# Patient Record
Sex: MALE | Race: WHITE | Employment: OTHER | URBAN - METROPOLITAN AREA
[De-identification: names, ages, dates, MRNs, and addresses within clinical notes are randomized per-mention and may not be internally consistent; named-entity substitution may affect disease eponyms.]

---

## 2024-08-12 ENCOUNTER — APPOINTMENT (OUTPATIENT)
Dept: GENERAL RADIOLOGY | Age: 74
DRG: 324 | End: 2024-08-12
Payer: OTHER GOVERNMENT

## 2024-08-12 ENCOUNTER — HOSPITAL ENCOUNTER (INPATIENT)
Age: 74
LOS: 2 days | Discharge: HOME | DRG: 324 | End: 2024-08-14
Attending: EMERGENCY MEDICINE | Admitting: INTERNAL MEDICINE
Payer: OTHER GOVERNMENT

## 2024-08-12 DIAGNOSIS — R07.9 CHEST PAIN, UNSPECIFIED TYPE: ICD-10-CM

## 2024-08-12 DIAGNOSIS — I21.3 STEMI (ST ELEVATION MYOCARDIAL INFARCTION) (HCC): ICD-10-CM

## 2024-08-12 DIAGNOSIS — R07.9 CHEST PAIN: ICD-10-CM

## 2024-08-12 DIAGNOSIS — I21.3 ST ELEVATION MYOCARDIAL INFARCTION (STEMI), UNSPECIFIED ARTERY (HCC): Primary | ICD-10-CM

## 2024-08-12 LAB
ALBUMIN SERPL-MCNC: 4.6 G/DL (ref 3.4–5)
ALBUMIN/GLOB SERPL: 1.5 {RATIO} (ref 1.1–2.2)
ALP SERPL-CCNC: 76 U/L (ref 40–129)
ALT SERPL-CCNC: 11 U/L (ref 10–40)
ANION GAP SERPL CALCULATED.3IONS-SCNC: 14 MMOL/L (ref 3–16)
APTT BLD: 25.3 SEC (ref 22.1–36.4)
AST SERPL-CCNC: 21 U/L (ref 15–37)
BASOPHILS # BLD: 0.1 K/UL (ref 0–0.2)
BASOPHILS NFR BLD: 0.9 %
BILIRUB SERPL-MCNC: 0.5 MG/DL (ref 0–1)
BUN SERPL-MCNC: 17 MG/DL (ref 7–20)
CALCIUM SERPL-MCNC: 9.4 MG/DL (ref 8.3–10.6)
CHLORIDE SERPL-SCNC: 97 MMOL/L (ref 99–110)
CO2 SERPL-SCNC: 23 MMOL/L (ref 21–32)
CREAT SERPL-MCNC: 1.4 MG/DL (ref 0.8–1.3)
DEPRECATED RDW RBC AUTO: 13.6 % (ref 12.4–15.4)
ECHO BSA: 1.91 M2
EOSINOPHIL # BLD: 0.1 K/UL (ref 0–0.6)
EOSINOPHIL NFR BLD: 1.6 %
GFR SERPLBLD CREATININE-BSD FMLA CKD-EPI: 52 ML/MIN/{1.73_M2}
GLUCOSE SERPL-MCNC: 118 MG/DL (ref 70–99)
HCT VFR BLD AUTO: 42.7 % (ref 40.5–52.5)
HGB BLD-MCNC: 14.7 G/DL (ref 13.5–17.5)
INR PPP: 0.95 (ref 0.85–1.15)
LYMPHOCYTES # BLD: 2.3 K/UL (ref 1–5.1)
LYMPHOCYTES NFR BLD: 35.2 %
MCH RBC QN AUTO: 31.7 PG (ref 26–34)
MCHC RBC AUTO-ENTMCNC: 34.5 G/DL (ref 31–36)
MCV RBC AUTO: 91.9 FL (ref 80–100)
MONOCYTES # BLD: 0.8 K/UL (ref 0–1.3)
MONOCYTES NFR BLD: 12.9 %
NEUTROPHILS # BLD: 3.2 K/UL (ref 1.7–7.7)
NEUTROPHILS NFR BLD: 49.4 %
NT-PROBNP SERPL-MCNC: 45 PG/ML (ref 0–449)
PLATELET # BLD AUTO: 161 K/UL (ref 135–450)
PMV BLD AUTO: 8.6 FL (ref 5–10.5)
POTASSIUM SERPL-SCNC: 3.8 MMOL/L (ref 3.5–5.1)
PROT SERPL-MCNC: 7.6 G/DL (ref 6.4–8.2)
PROTHROMBIN TIME: 12.9 SEC (ref 11.9–14.9)
RBC # BLD AUTO: 4.64 M/UL (ref 4.2–5.9)
SODIUM SERPL-SCNC: 134 MMOL/L (ref 136–145)
TROPONIN, HIGH SENSITIVITY: 15 NG/L (ref 0–22)
WBC # BLD AUTO: 6.4 K/UL (ref 4–11)

## 2024-08-12 PROCEDURE — 85730 THROMBOPLASTIN TIME PARTIAL: CPT

## 2024-08-12 PROCEDURE — 2500000003 HC RX 250 WO HCPCS: Performed by: INTERNAL MEDICINE

## 2024-08-12 PROCEDURE — 93458 L HRT ARTERY/VENTRICLE ANGIO: CPT | Performed by: INTERNAL MEDICINE

## 2024-08-12 PROCEDURE — 85610 PROTHROMBIN TIME: CPT

## 2024-08-12 PROCEDURE — 2580000003 HC RX 258: Performed by: INTERNAL MEDICINE

## 2024-08-12 PROCEDURE — 4A023N7 MEASUREMENT OF CARDIAC SAMPLING AND PRESSURE, LEFT HEART, PERCUTANEOUS APPROACH: ICD-10-PCS | Performed by: INTERNAL MEDICINE

## 2024-08-12 PROCEDURE — 6370000000 HC RX 637 (ALT 250 FOR IP): Performed by: PHYSICIAN ASSISTANT

## 2024-08-12 PROCEDURE — 6360000002 HC RX W HCPCS: Performed by: INTERNAL MEDICINE

## 2024-08-12 PROCEDURE — 96374 THER/PROPH/DIAG INJ IV PUSH: CPT

## 2024-08-12 PROCEDURE — 99152 MOD SED SAME PHYS/QHP 5/>YRS: CPT | Performed by: INTERNAL MEDICINE

## 2024-08-12 PROCEDURE — 93005 ELECTROCARDIOGRAM TRACING: CPT | Performed by: EMERGENCY MEDICINE

## 2024-08-12 PROCEDURE — 71045 X-RAY EXAM CHEST 1 VIEW: CPT

## 2024-08-12 PROCEDURE — 2500000003 HC RX 250 WO HCPCS: Performed by: EMERGENCY MEDICINE

## 2024-08-12 PROCEDURE — 96375 TX/PRO/DX INJ NEW DRUG ADDON: CPT

## 2024-08-12 PROCEDURE — 84484 ASSAY OF TROPONIN QUANT: CPT

## 2024-08-12 PROCEDURE — 80053 COMPREHEN METABOLIC PANEL: CPT

## 2024-08-12 PROCEDURE — 2709999900 HC NON-CHARGEABLE SUPPLY: Performed by: INTERNAL MEDICINE

## 2024-08-12 PROCEDURE — 6360000004 HC RX CONTRAST MEDICATION: Performed by: INTERNAL MEDICINE

## 2024-08-12 PROCEDURE — 2000000000 HC ICU R&B

## 2024-08-12 PROCEDURE — C1769 GUIDE WIRE: HCPCS | Performed by: INTERNAL MEDICINE

## 2024-08-12 PROCEDURE — 6360000002 HC RX W HCPCS: Performed by: EMERGENCY MEDICINE

## 2024-08-12 PROCEDURE — B2111ZZ FLUOROSCOPY OF MULTIPLE CORONARY ARTERIES USING LOW OSMOLAR CONTRAST: ICD-10-PCS | Performed by: INTERNAL MEDICINE

## 2024-08-12 PROCEDURE — 99285 EMERGENCY DEPT VISIT HI MDM: CPT

## 2024-08-12 PROCEDURE — 83880 ASSAY OF NATRIURETIC PEPTIDE: CPT

## 2024-08-12 PROCEDURE — 99153 MOD SED SAME PHYS/QHP EA: CPT | Performed by: INTERNAL MEDICINE

## 2024-08-12 PROCEDURE — B2151ZZ FLUOROSCOPY OF LEFT HEART USING LOW OSMOLAR CONTRAST: ICD-10-PCS | Performed by: INTERNAL MEDICINE

## 2024-08-12 PROCEDURE — C1894 INTRO/SHEATH, NON-LASER: HCPCS | Performed by: INTERNAL MEDICINE

## 2024-08-12 PROCEDURE — 85025 COMPLETE CBC W/AUTO DIFF WBC: CPT

## 2024-08-12 RX ORDER — HEPARIN SODIUM 1000 [USP'U]/ML
4000 INJECTION, SOLUTION INTRAVENOUS; SUBCUTANEOUS PRN
Status: DISCONTINUED | OUTPATIENT
Start: 2024-08-12 | End: 2024-08-14 | Stop reason: HOSPADM

## 2024-08-12 RX ORDER — METOPROLOL TARTRATE 1 MG/ML
5 INJECTION, SOLUTION INTRAVENOUS ONCE
Status: COMPLETED | OUTPATIENT
Start: 2024-08-12 | End: 2024-08-12

## 2024-08-12 RX ORDER — ACETAMINOPHEN 325 MG/1
650 TABLET ORAL EVERY 4 HOURS PRN
Status: DISCONTINUED | OUTPATIENT
Start: 2024-08-12 | End: 2024-08-14 | Stop reason: HOSPADM

## 2024-08-12 RX ORDER — HEPARIN SODIUM 1000 [USP'U]/ML
4000 INJECTION, SOLUTION INTRAVENOUS; SUBCUTANEOUS ONCE
Status: DISCONTINUED | OUTPATIENT
Start: 2024-08-12 | End: 2024-08-14 | Stop reason: HOSPADM

## 2024-08-12 RX ORDER — HEPARIN SODIUM 1000 [USP'U]/ML
2000 INJECTION, SOLUTION INTRAVENOUS; SUBCUTANEOUS PRN
Status: DISCONTINUED | OUTPATIENT
Start: 2024-08-12 | End: 2024-08-14 | Stop reason: HOSPADM

## 2024-08-12 RX ORDER — HEPARIN SODIUM 1000 [USP'U]/ML
INJECTION, SOLUTION INTRAVENOUS; SUBCUTANEOUS PRN
Status: DISCONTINUED | OUTPATIENT
Start: 2024-08-12 | End: 2024-08-12 | Stop reason: HOSPADM

## 2024-08-12 RX ORDER — SODIUM CHLORIDE 0.9 % (FLUSH) 0.9 %
5-40 SYRINGE (ML) INJECTION PRN
Status: DISCONTINUED | OUTPATIENT
Start: 2024-08-12 | End: 2024-08-14 | Stop reason: HOSPADM

## 2024-08-12 RX ORDER — MIDAZOLAM HYDROCHLORIDE 1 MG/ML
INJECTION INTRAMUSCULAR; INTRAVENOUS PRN
Status: DISCONTINUED | OUTPATIENT
Start: 2024-08-12 | End: 2024-08-12 | Stop reason: HOSPADM

## 2024-08-12 RX ORDER — ASPIRIN 81 MG/1
81 TABLET ORAL DAILY
Status: DISCONTINUED | OUTPATIENT
Start: 2024-08-13 | End: 2024-08-14 | Stop reason: HOSPADM

## 2024-08-12 RX ORDER — ASPIRIN 81 MG/1
324 TABLET, CHEWABLE ORAL ONCE
Status: COMPLETED | OUTPATIENT
Start: 2024-08-12 | End: 2024-08-12

## 2024-08-12 RX ORDER — SODIUM CHLORIDE 9 MG/ML
INJECTION, SOLUTION INTRAVENOUS CONTINUOUS
Status: ACTIVE | OUTPATIENT
Start: 2024-08-12 | End: 2024-08-13

## 2024-08-12 RX ORDER — METOPROLOL SUCCINATE 25 MG/1
25 TABLET, EXTENDED RELEASE ORAL DAILY
Status: DISCONTINUED | OUTPATIENT
Start: 2024-08-13 | End: 2024-08-14 | Stop reason: HOSPADM

## 2024-08-12 RX ORDER — HEPARIN SODIUM 10000 [USP'U]/100ML
5-30 INJECTION, SOLUTION INTRAVENOUS CONTINUOUS
Status: DISCONTINUED | OUTPATIENT
Start: 2024-08-12 | End: 2024-08-13

## 2024-08-12 RX ORDER — SODIUM CHLORIDE 0.9 % (FLUSH) 0.9 %
5-40 SYRINGE (ML) INJECTION EVERY 12 HOURS SCHEDULED
Status: DISCONTINUED | OUTPATIENT
Start: 2024-08-12 | End: 2024-08-14 | Stop reason: HOSPADM

## 2024-08-12 RX ORDER — LIDOCAINE HYDROCHLORIDE 10 MG/ML
INJECTION, SOLUTION INFILTRATION; PERINEURAL PRN
Status: DISCONTINUED | OUTPATIENT
Start: 2024-08-12 | End: 2024-08-12 | Stop reason: HOSPADM

## 2024-08-12 RX ORDER — ATORVASTATIN CALCIUM 80 MG/1
80 TABLET, FILM COATED ORAL NIGHTLY
Status: DISCONTINUED | OUTPATIENT
Start: 2024-08-13 | End: 2024-08-14 | Stop reason: HOSPADM

## 2024-08-12 RX ORDER — NITROGLYCERIN 20 MG/100ML
5-200 INJECTION INTRAVENOUS CONTINUOUS
Status: DISCONTINUED | OUTPATIENT
Start: 2024-08-12 | End: 2024-08-14 | Stop reason: HOSPADM

## 2024-08-12 RX ADMIN — ASPIRIN 324 MG: 81 TABLET, CHEWABLE ORAL at 21:18

## 2024-08-12 RX ADMIN — NITROGLYCERIN 0.5 INCH: 20 OINTMENT TOPICAL at 21:19

## 2024-08-12 RX ADMIN — Medication 12 UNITS/KG/HR: at 22:53

## 2024-08-12 RX ADMIN — NITROGLYCERIN 5 MCG/MIN: 20 INJECTION INTRAVENOUS at 21:44

## 2024-08-12 RX ADMIN — SODIUM CHLORIDE, PRESERVATIVE FREE 10 ML: 5 INJECTION INTRAVENOUS at 23:32

## 2024-08-12 RX ADMIN — SODIUM CHLORIDE: 9 INJECTION, SOLUTION INTRAVENOUS at 23:31

## 2024-08-12 RX ADMIN — METOPROLOL TARTRATE 5 MG: 5 INJECTION INTRAVENOUS at 21:44

## 2024-08-12 ASSESSMENT — ENCOUNTER SYMPTOMS
DIARRHEA: 0
NAUSEA: 0
SHORTNESS OF BREATH: 0
COUGH: 0
VOMITING: 0
ABDOMINAL PAIN: 0
RHINORRHEA: 0

## 2024-08-12 ASSESSMENT — PAIN - FUNCTIONAL ASSESSMENT: PAIN_FUNCTIONAL_ASSESSMENT: 0-10

## 2024-08-12 ASSESSMENT — PAIN DESCRIPTION - DESCRIPTORS: DESCRIPTORS: PRESSURE;TIGHTNESS

## 2024-08-12 ASSESSMENT — PAIN DESCRIPTION - ORIENTATION: ORIENTATION: LEFT

## 2024-08-12 ASSESSMENT — PAIN DESCRIPTION - LOCATION: LOCATION: CHEST

## 2024-08-12 ASSESSMENT — LIFESTYLE VARIABLES
HOW MANY STANDARD DRINKS CONTAINING ALCOHOL DO YOU HAVE ON A TYPICAL DAY: PATIENT DOES NOT DRINK
HOW OFTEN DO YOU HAVE A DRINK CONTAINING ALCOHOL: NEVER

## 2024-08-12 ASSESSMENT — PAIN SCALES - GENERAL
PAINLEVEL_OUTOF10: 4
PAINLEVEL_OUTOF10: 0
PAINLEVEL_OUTOF10: 7

## 2024-08-13 ENCOUNTER — APPOINTMENT (OUTPATIENT)
Age: 74
DRG: 324 | End: 2024-08-13
Attending: INTERNAL MEDICINE
Payer: OTHER GOVERNMENT

## 2024-08-13 ENCOUNTER — APPOINTMENT (OUTPATIENT)
Dept: VASCULAR LAB | Age: 74
DRG: 324 | End: 2024-08-13
Payer: OTHER GOVERNMENT

## 2024-08-13 DIAGNOSIS — I25.10 CORONARY ARTERY DISEASE DUE TO LIPID RICH PLAQUE: Primary | ICD-10-CM

## 2024-08-13 DIAGNOSIS — I25.83 CORONARY ARTERY DISEASE DUE TO LIPID RICH PLAQUE: Primary | ICD-10-CM

## 2024-08-13 LAB
ANION GAP SERPL CALCULATED.3IONS-SCNC: 13 MMOL/L (ref 3–16)
ANTI-XA UNFRAC HEPARIN: 0.22 IU/ML (ref 0.3–0.7)
ANTI-XA UNFRAC HEPARIN: 0.27 IU/ML (ref 0.3–0.7)
BUN SERPL-MCNC: 14 MG/DL (ref 7–20)
CALCIUM SERPL-MCNC: 9.1 MG/DL (ref 8.3–10.6)
CHLORIDE SERPL-SCNC: 102 MMOL/L (ref 99–110)
CHOLEST SERPL-MCNC: 108 MG/DL (ref 0–199)
CO2 SERPL-SCNC: 24 MMOL/L (ref 21–32)
CREAT SERPL-MCNC: 1.3 MG/DL (ref 0.8–1.3)
DEPRECATED RDW RBC AUTO: 13.4 % (ref 12.4–15.4)
ECHO AO ASC DIAM: 2.7 CM
ECHO AO ASCENDING AORTA INDEX: 1.44 CM/M2
ECHO AO ROOT DIAM: 3.7 CM
ECHO AO ROOT INDEX: 1.97 CM/M2
ECHO AV AREA PEAK VELOCITY: 2.6 CM2
ECHO AV AREA VTI: 2.7 CM2
ECHO AV AREA/BSA PEAK VELOCITY: 1.4 CM2/M2
ECHO AV AREA/BSA VTI: 1.4 CM2/M2
ECHO AV MEAN GRADIENT: 3 MMHG
ECHO AV MEAN VELOCITY: 0.8 M/S
ECHO AV PEAK GRADIENT: 5 MMHG
ECHO AV PEAK VELOCITY: 1.1 M/S
ECHO AV VELOCITY RATIO: 0.82
ECHO AV VTI: 22.9 CM
ECHO BSA: 1.88 M2
ECHO EST RA PRESSURE: 3 MMHG
ECHO IVC PROX: 1.8 CM
ECHO LA AREA 2C: 23 CM2
ECHO LA AREA 4C: 22.2 CM2
ECHO LA MAJOR AXIS: 5.2 CM
ECHO LA MINOR AXIS: 6.3 CM
ECHO LA VOL BP: 76 ML (ref 18–58)
ECHO LA VOL MOD A2C: 69 ML (ref 18–58)
ECHO LA VOL MOD A4C: 72 ML (ref 18–58)
ECHO LA VOL/BSA BIPLANE: 40 ML/M2 (ref 16–34)
ECHO LA VOLUME INDEX MOD A2C: 37 ML/M2 (ref 16–34)
ECHO LA VOLUME INDEX MOD A4C: 38 ML/M2 (ref 16–34)
ECHO LV E' LATERAL VELOCITY: 7 CM/S
ECHO LV E' SEPTAL VELOCITY: 8 CM/S
ECHO LV EDV A2C: 119 ML
ECHO LV EDV A4C: 166 ML
ECHO LV EDV INDEX A4C: 88 ML/M2
ECHO LV EDV NDEX A2C: 63 ML/M2
ECHO LV EJECTION FRACTION A2C: 49 %
ECHO LV EJECTION FRACTION A4C: 50 %
ECHO LV EJECTION FRACTION BIPLANE: 50 % (ref 55–100)
ECHO LV ESV A2C: 61 ML
ECHO LV ESV A4C: 83 ML
ECHO LV ESV INDEX A2C: 32 ML/M2
ECHO LV ESV INDEX A4C: 44 ML/M2
ECHO LV FRACTIONAL SHORTENING: 24 % (ref 28–44)
ECHO LV INTERNAL DIMENSION DIASTOLE INDEX: 2.45 CM/M2
ECHO LV INTERNAL DIMENSION DIASTOLIC: 4.6 CM (ref 4.2–5.9)
ECHO LV INTERNAL DIMENSION SYSTOLIC INDEX: 1.86 CM/M2
ECHO LV INTERNAL DIMENSION SYSTOLIC: 3.5 CM
ECHO LV IVSD: 0.9 CM (ref 0.6–1)
ECHO LV MASS 2D: 137.7 G (ref 88–224)
ECHO LV MASS INDEX 2D: 73.3 G/M2 (ref 49–115)
ECHO LV POSTERIOR WALL DIASTOLIC: 0.9 CM (ref 0.6–1)
ECHO LV RELATIVE WALL THICKNESS RATIO: 0.39
ECHO LVOT AREA: 3.1 CM2
ECHO LVOT AV VTI INDEX: 0.84
ECHO LVOT DIAM: 2 CM
ECHO LVOT MEAN GRADIENT: 2 MMHG
ECHO LVOT PEAK GRADIENT: 4 MMHG
ECHO LVOT PEAK VELOCITY: 0.9 M/S
ECHO LVOT STROKE VOLUME INDEX: 32.2 ML/M2
ECHO LVOT SV: 60.6 ML
ECHO LVOT VTI: 19.3 CM
ECHO MV A VELOCITY: 0.53 M/S
ECHO MV E VELOCITY: 0.85 M/S
ECHO MV E/A RATIO: 1.6
ECHO MV E/E' LATERAL: 12.14
ECHO MV E/E' RATIO (AVERAGED): 11.38
ECHO MV E/E' SEPTAL: 10.63
ECHO PV MAX VELOCITY: 0.9 M/S
ECHO PV PEAK GRADIENT: 3 MMHG
ECHO RA AREA 4C: 16.7 CM2
ECHO RA END SYSTOLIC VOLUME APICAL 4 CHAMBER INDEX BSA: 27 ML/M2
ECHO RA VOLUME: 50 ML
ECHO RIGHT VENTRICULAR SYSTOLIC PRESSURE (RVSP): 35 MMHG
ECHO RV BASAL DIMENSION: 3.8 CM
ECHO RV FREE WALL PEAK S': 16 CM/S
ECHO RV INTERNAL DIMENSION: 3.8 CM
ECHO RV LONGITUDINAL DIMENSION: 6.4 CM
ECHO RV MID DIMENSION: 3.4 CM
ECHO RV TAPSE: 3.3 CM (ref 1.7–?)
ECHO TV REGURGITANT MAX VELOCITY: 2.83 M/S
ECHO TV REGURGITANT PEAK GRADIENT: 32 MMHG
EKG ATRIAL RATE: 64 BPM
EKG ATRIAL RATE: 81 BPM
EKG ATRIAL RATE: 90 BPM
EKG DIAGNOSIS: NORMAL
EKG P AXIS: 61 DEGREES
EKG P AXIS: 70 DEGREES
EKG P AXIS: 74 DEGREES
EKG P-R INTERVAL: 186 MS
EKG P-R INTERVAL: 188 MS
EKG P-R INTERVAL: 204 MS
EKG Q-T INTERVAL: 374 MS
EKG Q-T INTERVAL: 394 MS
EKG Q-T INTERVAL: 438 MS
EKG QRS DURATION: 94 MS
EKG QTC CALCULATION (BAZETT): 451 MS
EKG QTC CALCULATION (BAZETT): 457 MS
EKG QTC CALCULATION (BAZETT): 457 MS
EKG R AXIS: -21 DEGREES
EKG R AXIS: -27 DEGREES
EKG R AXIS: -29 DEGREES
EKG T AXIS: 18 DEGREES
EKG T AXIS: 23 DEGREES
EKG T AXIS: 31 DEGREES
EKG VENTRICULAR RATE: 64 BPM
EKG VENTRICULAR RATE: 81 BPM
EKG VENTRICULAR RATE: 90 BPM
EST. AVERAGE GLUCOSE BLD GHB EST-MCNC: 108.3 MG/DL
GFR SERPLBLD CREATININE-BSD FMLA CKD-EPI: 57 ML/MIN/{1.73_M2}
GLUCOSE BLD-MCNC: 107 MG/DL (ref 70–99)
GLUCOSE SERPL-MCNC: 107 MG/DL (ref 70–99)
HBA1C MFR BLD: 5.4 %
HCT VFR BLD AUTO: 40.4 % (ref 40.5–52.5)
HDLC SERPL-MCNC: 49 MG/DL (ref 40–60)
HGB BLD-MCNC: 13.9 G/DL (ref 13.5–17.5)
INR PPP: 1.05 (ref 0.85–1.15)
LDLC SERPL CALC-MCNC: 49 MG/DL
MCH RBC QN AUTO: 31.4 PG (ref 26–34)
MCHC RBC AUTO-ENTMCNC: 34.4 G/DL (ref 31–36)
MCV RBC AUTO: 91.3 FL (ref 80–100)
PERFORMED ON: ABNORMAL
PLATELET # BLD AUTO: 144 K/UL (ref 135–450)
PMV BLD AUTO: 8.7 FL (ref 5–10.5)
POC ACT LR: 175 SEC
POC ACT LR: 237 SEC
POTASSIUM SERPL-SCNC: 4 MMOL/L (ref 3.5–5.1)
PROTHROMBIN TIME: 13.9 SEC (ref 11.9–14.9)
RBC # BLD AUTO: 4.42 M/UL (ref 4.2–5.9)
SODIUM SERPL-SCNC: 139 MMOL/L (ref 136–145)
TRIGL SERPL-MCNC: 51 MG/DL (ref 0–150)
VAS LEFT CCA DIST EDV: 18.6 CM/S
VAS LEFT CCA DIST PSV: 80.1 CM/S
VAS LEFT CCA MID EDV: 19.9 CM/S
VAS LEFT CCA MID PSV: 98.8 CM/S
VAS LEFT CCA PROX EDV: 20.4 CM/S
VAS LEFT CCA PROX PSV: 94.1 CM/S
VAS LEFT ECA EDV: 11.3 CM/S
VAS LEFT ECA PSV: 139 CM/S
VAS LEFT GSV AT KNEE DIAM: 0.6 MM
VAS LEFT GSV BK DIST DIAM: 0.4 MM
VAS LEFT GSV BK MID DIAM: 0.5 MM
VAS LEFT GSV BK PROX DIAM: 1.4 MM
VAS LEFT GSV JUNC DIAM: 3 MM
VAS LEFT GSV THIGH DIST DIAM: 0.9 MM
VAS LEFT GSV THIGH MID DIAM: 1.6 MM
VAS LEFT GSV THIGH PROX DIAM: 1.3 MM
VAS LEFT ICA DIST EDV: 18 CM/S
VAS LEFT ICA DIST PSV: 54.9 CM/S
VAS LEFT ICA MID EDV: 22.4 CM/S
VAS LEFT ICA MID PSV: 82.6 CM/S
VAS LEFT ICA PROX EDV: 26 CM/S
VAS LEFT ICA PROX PSV: 101 CM/S
VAS LEFT ICA/CCA PSV: 1.02
VAS LEFT SUBCLAVIAN PROX PSV: 149 CM/S
VAS LEFT VERTEBRAL EDV: 7.01 CM/S
VAS LEFT VERTEBRAL PSV: 25.9 CM/S
VAS RIGHT CCA DIST EDV: 11 CM/S
VAS RIGHT CCA DIST PSV: 86.2 CM/S
VAS RIGHT CCA MID EDV: 25.8 CM/S
VAS RIGHT CCA MID PSV: 88.4 CM/S
VAS RIGHT CCA PROX EDV: 16.5 CM/S
VAS RIGHT CCA PROX PSV: 66.4 CM/S
VAS RIGHT ECA EDV: 15.7 CM/S
VAS RIGHT ECA PSV: 118 CM/S
VAS RIGHT GSV AT KNEE DIAM: 0.8 MM
VAS RIGHT GSV BK DIST DIAM: 1.6 MM
VAS RIGHT GSV BK MID DIAM: 0.7 MM
VAS RIGHT GSV BK PROX DIAM: 1.6 MM
VAS RIGHT GSV JUNC DIAM: 4.7 MM
VAS RIGHT GSV THIGH DIST DIAM: 2.2 MM
VAS RIGHT GSV THIGH MID DIAM: 2.4 MM
VAS RIGHT GSV THIGH PROX DIAM: 2.3 MM
VAS RIGHT ICA DIST EDV: 22.7 CM/S
VAS RIGHT ICA DIST PSV: 77.6 CM/S
VAS RIGHT ICA MID EDV: 27.4 CM/S
VAS RIGHT ICA MID PSV: 63.5 CM/S
VAS RIGHT ICA PROX EDV: 31.4 CM/S
VAS RIGHT ICA PROX PSV: 114 CM/S
VAS RIGHT ICA/CCA PSV: 1.29
VAS RIGHT SUBCLAVIAN PROX PSV: 76.3 CM/S
VAS RIGHT VERTEBRAL EDV: 6.19 CM/S
VAS RIGHT VERTEBRAL PSV: 26.5 CM/S
VLDLC SERPL CALC-MCNC: 10 MG/DL
WBC # BLD AUTO: 6.6 K/UL (ref 4–11)

## 2024-08-13 PROCEDURE — 2709999900 HC NON-CHARGEABLE SUPPLY: Performed by: INTERNAL MEDICINE

## 2024-08-13 PROCEDURE — 85610 PROTHROMBIN TIME: CPT

## 2024-08-13 PROCEDURE — C1769 GUIDE WIRE: HCPCS | Performed by: INTERNAL MEDICINE

## 2024-08-13 PROCEDURE — 6370000000 HC RX 637 (ALT 250 FOR IP): Performed by: INTERNAL MEDICINE

## 2024-08-13 PROCEDURE — C1761 HC CATH TRANSLUM INTRAVASCULAR LITHOTRIPSY CORONARY: HCPCS | Performed by: INTERNAL MEDICINE

## 2024-08-13 PROCEDURE — 93005 ELECTROCARDIOGRAM TRACING: CPT | Performed by: INTERNAL MEDICINE

## 2024-08-13 PROCEDURE — 85520 HEPARIN ASSAY: CPT

## 2024-08-13 PROCEDURE — C1894 INTRO/SHEATH, NON-LASER: HCPCS | Performed by: INTERNAL MEDICINE

## 2024-08-13 PROCEDURE — 99152 MOD SED SAME PHYS/QHP 5/>YRS: CPT | Performed by: INTERNAL MEDICINE

## 2024-08-13 PROCEDURE — 6360000002 HC RX W HCPCS: Performed by: INTERNAL MEDICINE

## 2024-08-13 PROCEDURE — 99153 MOD SED SAME PHYS/QHP EA: CPT | Performed by: INTERNAL MEDICINE

## 2024-08-13 PROCEDURE — 93880 EXTRACRANIAL BILAT STUDY: CPT

## 2024-08-13 PROCEDURE — 2580000003 HC RX 258: Performed by: INTERNAL MEDICINE

## 2024-08-13 PROCEDURE — 83036 HEMOGLOBIN GLYCOSYLATED A1C: CPT

## 2024-08-13 PROCEDURE — C8929 TTE W OR WO FOL WCON,DOPPLER: HCPCS

## 2024-08-13 PROCEDURE — 2500000003 HC RX 250 WO HCPCS: Performed by: INTERNAL MEDICINE

## 2024-08-13 PROCEDURE — 6360000002 HC RX W HCPCS: Performed by: EMERGENCY MEDICINE

## 2024-08-13 PROCEDURE — 80061 LIPID PANEL: CPT

## 2024-08-13 PROCEDURE — 02F03ZZ FRAGMENTATION IN CORONARY ARTERY, ONE ARTERY, PERCUTANEOUS APPROACH: ICD-10-PCS | Performed by: INTERNAL MEDICINE

## 2024-08-13 PROCEDURE — B2111ZZ FLUOROSCOPY OF MULTIPLE CORONARY ARTERIES USING LOW OSMOLAR CONTRAST: ICD-10-PCS | Performed by: INTERNAL MEDICINE

## 2024-08-13 PROCEDURE — 6360000004 HC RX CONTRAST MEDICATION: Performed by: INTERNAL MEDICINE

## 2024-08-13 PROCEDURE — 92972 PERQ TRLUML CORONRY LITHOTRP: CPT | Performed by: INTERNAL MEDICINE

## 2024-08-13 PROCEDURE — C1725 CATH, TRANSLUMIN NON-LASER: HCPCS | Performed by: INTERNAL MEDICINE

## 2024-08-13 PROCEDURE — 80048 BASIC METABOLIC PNL TOTAL CA: CPT

## 2024-08-13 PROCEDURE — 85027 COMPLETE CBC AUTOMATED: CPT

## 2024-08-13 PROCEDURE — 92928 PRQ TCAT PLMT NTRAC ST 1 LES: CPT | Performed by: INTERNAL MEDICINE

## 2024-08-13 PROCEDURE — 85347 COAGULATION TIME ACTIVATED: CPT

## 2024-08-13 PROCEDURE — 027136Z DILATION OF CORONARY ARTERY, TWO ARTERIES WITH THREE DRUG-ELUTING INTRALUMINAL DEVICES, PERCUTANEOUS APPROACH: ICD-10-PCS | Performed by: INTERNAL MEDICINE

## 2024-08-13 PROCEDURE — 4A023N7 MEASUREMENT OF CARDIAC SAMPLING AND PRESSURE, LEFT HEART, PERCUTANEOUS APPROACH: ICD-10-PCS | Performed by: INTERNAL MEDICINE

## 2024-08-13 PROCEDURE — 93970 EXTREMITY STUDY: CPT

## 2024-08-13 PROCEDURE — 36415 COLL VENOUS BLD VENIPUNCTURE: CPT

## 2024-08-13 PROCEDURE — C1874 STENT, COATED/COV W/DEL SYS: HCPCS | Performed by: INTERNAL MEDICINE

## 2024-08-13 PROCEDURE — 2000000000 HC ICU R&B

## 2024-08-13 PROCEDURE — C1887 CATHETER, GUIDING: HCPCS | Performed by: INTERNAL MEDICINE

## 2024-08-13 RX ORDER — EPTIFIBATIDE 0.75 MG/ML
2 INJECTION, SOLUTION INTRAVENOUS CONTINUOUS
Status: DISPENSED | OUTPATIENT
Start: 2024-08-13 | End: 2024-08-13

## 2024-08-13 RX ORDER — SODIUM CHLORIDE 0.9 % (FLUSH) 0.9 %
5-40 SYRINGE (ML) INJECTION PRN
Status: DISCONTINUED | OUTPATIENT
Start: 2024-08-13 | End: 2024-08-14 | Stop reason: HOSPADM

## 2024-08-13 RX ORDER — GABAPENTIN 100 MG/1
100 CAPSULE ORAL 2 TIMES DAILY
COMMUNITY
Start: 2024-05-02

## 2024-08-13 RX ORDER — ASPIRIN 81 MG/1
81 TABLET, CHEWABLE ORAL DAILY
Status: DISCONTINUED | OUTPATIENT
Start: 2024-08-14 | End: 2024-08-14 | Stop reason: HOSPADM

## 2024-08-13 RX ORDER — FENTANYL CITRATE 50 UG/ML
INJECTION, SOLUTION INTRAMUSCULAR; INTRAVENOUS PRN
Status: DISCONTINUED | OUTPATIENT
Start: 2024-08-13 | End: 2024-08-13 | Stop reason: HOSPADM

## 2024-08-13 RX ORDER — LISINOPRIL 5 MG/1
7.5 TABLET ORAL DAILY
COMMUNITY
Start: 2024-05-22

## 2024-08-13 RX ORDER — SILDENAFIL 100 MG/1
100 TABLET, FILM COATED ORAL PRN
COMMUNITY
Start: 2024-01-25

## 2024-08-13 RX ORDER — SODIUM CHLORIDE 9 MG/ML
INJECTION, SOLUTION INTRAVENOUS PRN
Status: DISCONTINUED | OUTPATIENT
Start: 2024-08-13 | End: 2024-08-14 | Stop reason: HOSPADM

## 2024-08-13 RX ORDER — NITROGLYCERIN 20 MG/100ML
INJECTION INTRAVENOUS CONTINUOUS PRN
Status: COMPLETED | OUTPATIENT
Start: 2024-08-13 | End: 2024-08-13

## 2024-08-13 RX ORDER — MIDAZOLAM HYDROCHLORIDE 1 MG/ML
INJECTION INTRAMUSCULAR; INTRAVENOUS PRN
Status: DISCONTINUED | OUTPATIENT
Start: 2024-08-13 | End: 2024-08-13 | Stop reason: HOSPADM

## 2024-08-13 RX ORDER — HYDROXYCHLOROQUINE SULFATE 200 MG/1
200 TABLET, FILM COATED ORAL 2 TIMES DAILY
COMMUNITY
Start: 2024-05-12

## 2024-08-13 RX ORDER — SODIUM CHLORIDE 0.9 % (FLUSH) 0.9 %
5-40 SYRINGE (ML) INJECTION EVERY 12 HOURS SCHEDULED
Status: DISCONTINUED | OUTPATIENT
Start: 2024-08-13 | End: 2024-08-14 | Stop reason: HOSPADM

## 2024-08-13 RX ORDER — ASPIRIN 81 MG/1
81 TABLET ORAL DAILY
COMMUNITY
Start: 2022-10-06

## 2024-08-13 RX ORDER — HEPARIN SODIUM 1000 [USP'U]/ML
INJECTION, SOLUTION INTRAVENOUS; SUBCUTANEOUS PRN
Status: DISCONTINUED | OUTPATIENT
Start: 2024-08-13 | End: 2024-08-13 | Stop reason: HOSPADM

## 2024-08-13 RX ORDER — ACETAMINOPHEN 325 MG/1
650 TABLET ORAL EVERY 4 HOURS PRN
Status: DISCONTINUED | OUTPATIENT
Start: 2024-08-13 | End: 2024-08-14 | Stop reason: HOSPADM

## 2024-08-13 RX ORDER — EPTIFIBATIDE 0.75 MG/ML
INJECTION, SOLUTION INTRAVENOUS CONTINUOUS PRN
Status: COMPLETED | OUTPATIENT
Start: 2024-08-13 | End: 2024-08-13

## 2024-08-13 RX ORDER — PREDNISONE 2.5 MG/1
2.5 TABLET ORAL DAILY
COMMUNITY
Start: 2024-08-01

## 2024-08-13 RX ORDER — OXYMETAZOLINE HYDROCHLORIDE 0.05 G/100ML
2 SPRAY NASAL 2 TIMES DAILY PRN
Status: DISCONTINUED | OUTPATIENT
Start: 2024-08-13 | End: 2024-08-14 | Stop reason: HOSPADM

## 2024-08-13 RX ORDER — SODIUM CHLORIDE 9 MG/ML
INJECTION, SOLUTION INTRAVENOUS CONTINUOUS
Status: DISCONTINUED | OUTPATIENT
Start: 2024-08-13 | End: 2024-08-14

## 2024-08-13 RX ADMIN — SODIUM CHLORIDE: 9 INJECTION, SOLUTION INTRAVENOUS at 17:18

## 2024-08-13 RX ADMIN — ATORVASTATIN CALCIUM 80 MG: 80 TABLET, FILM COATED ORAL at 00:24

## 2024-08-13 RX ADMIN — TICAGRELOR 180 MG: 90 TABLET ORAL at 12:44

## 2024-08-13 RX ADMIN — NITROGLYCERIN 10 MCG/MIN: 20 INJECTION INTRAVENOUS at 18:30

## 2024-08-13 RX ADMIN — TICAGRELOR 90 MG: 90 TABLET ORAL at 21:26

## 2024-08-13 RX ADMIN — HEPARIN SODIUM 2000 UNITS: 1000 INJECTION INTRAVENOUS; SUBCUTANEOUS at 12:11

## 2024-08-13 RX ADMIN — ATORVASTATIN CALCIUM 80 MG: 80 TABLET, FILM COATED ORAL at 21:26

## 2024-08-13 RX ADMIN — ASPIRIN 81 MG: 81 TABLET, COATED ORAL at 09:03

## 2024-08-13 RX ADMIN — SODIUM CHLORIDE, PRESERVATIVE FREE 10 ML: 5 INJECTION INTRAVENOUS at 21:27

## 2024-08-13 RX ADMIN — PERFLUTREN 1.5 ML: 6.52 INJECTION, SUSPENSION INTRAVENOUS at 08:46

## 2024-08-13 RX ADMIN — EPTIFIBATIDE 2 MCG/KG/MIN: 0.75 INJECTION INTRAVENOUS at 17:13

## 2024-08-13 ASSESSMENT — PAIN SCALES - GENERAL
PAINLEVEL_OUTOF10: 0

## 2024-08-13 ASSESSMENT — LIFESTYLE VARIABLES
HOW OFTEN DO YOU HAVE A DRINK CONTAINING ALCOHOL: NEVER
HOW MANY STANDARD DRINKS CONTAINING ALCOHOL DO YOU HAVE ON A TYPICAL DAY: PATIENT DOES NOT DRINK

## 2024-08-13 NOTE — CARE COORDINATION
Chart reviewed at this time for discharge planning.     Pt from home. CVTS placed. Pt on heparin and nitro gtts.    CM will follow for dc plan and needs.    Sarah Watson RN, BSN  [phone]

## 2024-08-13 NOTE — H&P
Cardiology Consultation/History and Physical                                                                  Pt Name: Joe Zepeda  Age: 74 y.o.  Sex: male  : 1950  Location: [facility]    Referring Physician: Delon Floyd DO        Reason for Consult:     Reason for Consultation/Chief Complaint: Chest pain    HPI:      Joe Zepeda is a 74 y.o. male with a past medical history of hypertension presents with chest pain.    Onset of pain approximately an hour prior to presented to ED via personal vehicle. Some hyperacute ST-T wave changes and frequent PVCs noted on ecg. Pt had persistent chest pain despite receiving 3 SL NG and was placed on NG gtt.  Pain resolved in the cath lab prior to angiography.    Found to have severe multivessel native CAD with KATHERIN 3 flow      Histories     Past Medical History:   has no past medical history on file.    Surgical History:   has no past surgical history on file.     Social History:        Family History:  No evidence for sudden cardiac death or premature CAD      Medications:       Home Medications  Were reviewed and are listed in nursing record. and/or listed below  Prior to Admission medications    Not on File          Inpatient Medications:      IV drips:   nitroGLYCERIN 5 mcg/min (24)       PRN:      Allergy:     Patient has no known allergies.       Review of Systems:     All 12 point review of symptoms completed. Pertinent positives identified in the HPI, all other review of symptoms negative.      Physical Examination:     Vitals:    24 2105 24 2121 24 2131   BP: (!) 147/102 (!) 161/97 (!) 150/105   Pulse: 93 91 94   Resp: 18 15 19   Temp: 97.5 °F (36.4 °C)     TempSrc: Oral     SpO2: 95% 93% 95%   Weight: 74.8 kg (165 lb)     Height: 1.753 m (5' 9\")         Wt Readings from Last 3 Encounters:   24 74.8 kg (165 lb)       Physical Exam  Vitals reviewed.   Constitutional:       Appearance: Normal appearance.

## 2024-08-13 NOTE — PRE SEDATION
Sedation Plan  ASA: class 3 - patient with severe systemic disease     Mallampati class: II - soft palate, uvula, fauces visible.    Sedation plan: local anesthesia and moderate (conscious sedation)          Immediate reassessment prior to sedation:  Patient's status reviewed and vital signs assessed; acceptable to perform procedure and proceed to administer sedation as planned.

## 2024-08-13 NOTE — ED PROVIDER NOTES
Chloride 97 (L) 99 - 110 mmol/L    CO2 23 21 - 32 mmol/L    Anion Gap 14 3 - 16    Glucose 118 (H) 70 - 99 mg/dL    BUN 17 7 - 20 mg/dL    Creatinine 1.4 (H) 0.8 - 1.3 mg/dL    Est, Glom Filt Rate 52 (A) >60    Calcium 9.4 8.3 - 10.6 mg/dL    Total Protein 7.6 6.4 - 8.2 g/dL    Albumin 4.6 3.4 - 5.0 g/dL    Albumin/Globulin Ratio 1.5 1.1 - 2.2    Total Bilirubin 0.5 0.0 - 1.0 mg/dL    Alkaline Phosphatase 76 40 - 129 U/L    ALT 11 10 - 40 U/L    AST 21 15 - 37 U/L   Protime-INR   Result Value Ref Range    Protime 12.9 11.9 - 14.9 sec    INR 0.95 0.85 - 1.15   APTT   Result Value Ref Range    aPTT 25.3 22.1 - 36.4 sec   EKG 12 Lead   Result Value Ref Range    Ventricular Rate 90 BPM    Atrial Rate 90 BPM    P-R Interval 188 ms    QRS Duration 94 ms    Q-T Interval 374 ms    QTc Calculation (Bazett) 457 ms    P Axis 70 degrees    R Axis -29 degrees    T Axis 18 degrees    Diagnosis       ** Critical Test Result: STEMISinus rhythm with frequent Premature ventricular complexesST elevation consider anterior injury or acute infarct** ** ACUTE MI / STEMI ** **Abnormal ECG       BRIEF ED COURSE/[facility]  Old records reviewed. Labs and imaging reviewed.  Patient seen and evaluated by myself along with the ALIZE.  Patient presents for evaluation of chest pain rating to his left arm.  Started about half hour before arrival.  Patient is given aspirin and nitro here with no relief.  Started on nitro drip as well.  His initial EKG was read as a STEMI by computer however there are some elevations but no reciprocal changes.  Repeat was performed 5 minutes later and this time does show again the elevations appear to be worsening in V2 and V3 and V4 as well as some depressions in 2 and 3.  STEMI alert was called and he was taken to the Cath Lab for definitive treatment.    Medications given:  Medications   nitroGLYCERIN 200 mcg/mL in dextrose 5% (5 mcg/min IntraVENous New Bag 8/12/24 7953)   fentaNYL (SUBLIMAZE) injection (25 mcg 
affecting care:  has no past medical history on file.    CONSULTS: (Who and What was discussed)  None      Social Determinants Significantly Affecting Health : None    Records Reviewed (External and Source) none,    CC/HPI Summary, DDx, ED Course, and Reassessment: Briefly, this is a 74-year-old male who presents to the emergency department today for for chest pain.  Patient reports that approximately 30 minutes before arriving to the ED, he states that he was sitting, will notice a tightness or pressure to the left side of his chest, radiating towards his arm.    On examination, he is hypertensive, vital signs are otherwise stable.  He has a history of hypertension.    Disposition Considerations (tests considered but not done, Admit vs D/C, Shared Decision Making, Pt Expectation of Test or Tx.):    EKG as documented by my attending, please see his note for further details.  Initial EKG does show some PVCs, there are no reciprocal changes.    Repeat EKG does show ST segment elevation in V2 V3, with ST segment depressions in the inferior leads.  Code STEMI was called.    Patient was given aspirin, nitro.    His lab work is otherwise unremarkable, chest x-ray is negative cardiology was consulted, plan is to take the patient to the Cath Lab.    I am the Primary Clinician of Record.  FINAL IMPRESSION      1. ST elevation myocardial infarction (STEMI), unspecified artery (HCC)    2. STEMI (ST elevation myocardial infarction) (HCC)          DISPOSITION/PLAN     DISPOSITION        PATIENT REFERRED TO:  No follow-up provider specified.    DISCHARGE MEDICATIONS:  New Prescriptions    No medications on file       DISCONTINUED MEDICATIONS:  Discontinued Medications    No medications on file              (Please note that portions of this note were completed with a voice recognition program.  Efforts were made to edit the dictations but occasionally words are mis-transcribed.)    Ale Johnston PA-C (electronically signed)

## 2024-08-13 NOTE — PROGRESS NOTES
[facility]  Cardiology Consult    Joe Zepeda  1950 August 13, 2024      Reason for Referral: Chest pain    CC: Chest pain      Subjective:     History of Present Illness:    Joe Zepeda is a 74 y.o. patient with a PMH significant for hypertension hyperlipidemia presented with complaints of chest pain.  Chest pain is improved.  No shortness of breath reported now.         Past Medical History:   has no past medical history on file.    Surgical History:   has a past surgical history that includes hernia repair.     Social History:   reports that he quit smoking about 54 years ago. His smoking use included cigarettes. He started smoking about 22 years ago. He has a 22.6 pack-year smoking history. He has been exposed to tobacco smoke. He has never used smokeless tobacco. He reports that he does not drink alcohol and does not use drugs.     Family History:  family history is not on file.    Home Medications:  Were reviewed and are listed in nursing record and/or below  Prior to Admission medications    Medication Sig Start Date End Date Taking? Authorizing Provider   hydroxychloroquine (PLAQUENIL) 200 MG tablet Take 1 tablet by mouth 2 times daily 5/12/24  Yes Amos Solitario MD   lisinopril (PRINIVIL;ZESTRIL) 5 MG tablet Take 1.5 tablets by mouth daily 5/22/24  Yes Amos Solitario MD   predniSONE (DELTASONE) 2.5 MG tablet Take 1 tablet by mouth daily 8/1/24  Yes Amos Solitario MD   gabapentin (NEURONTIN) 100 MG capsule Take 1 capsule by mouth in the morning and at bedtime. 5/2/24  Yes mAos Solitario MD   aspirin 81 MG EC tablet Take 1 tablet by mouth daily 10/6/22  Yes Amos Solitario MD   sildenafil (VIAGRA) 100 MG tablet Take 1 tablet by mouth as needed for Erectile Dysfunction 1/25/24  Yes Amos Solitario MD        CURRENT Medications:  oxymetazoline (AFRIN) 0.05 % nasal spray 2 spray, BID PRN  nitroGLYCERIN 200 mcg/mL in dextrose 5%,

## 2024-08-14 ENCOUNTER — TELEPHONE (OUTPATIENT)
Dept: CARDIOLOGY CLINIC | Age: 74
End: 2024-08-14

## 2024-08-14 VITALS
HEART RATE: 74 BPM | WEIGHT: 160.6 LBS | SYSTOLIC BLOOD PRESSURE: 116 MMHG | OXYGEN SATURATION: 94 % | RESPIRATION RATE: 16 BRPM | TEMPERATURE: 98.4 F | DIASTOLIC BLOOD PRESSURE: 80 MMHG | HEIGHT: 69 IN | BODY MASS INDEX: 23.79 KG/M2

## 2024-08-14 DIAGNOSIS — I25.10 CORONARY ARTERY DISEASE INVOLVING NATIVE CORONARY ARTERY OF NATIVE HEART WITHOUT ANGINA PECTORIS: Primary | ICD-10-CM

## 2024-08-14 LAB
ANION GAP SERPL CALCULATED.3IONS-SCNC: 10 MMOL/L (ref 3–16)
BUN SERPL-MCNC: 14 MG/DL (ref 7–20)
CALCIUM SERPL-MCNC: 8.8 MG/DL (ref 8.3–10.6)
CHLORIDE SERPL-SCNC: 105 MMOL/L (ref 99–110)
CO2 SERPL-SCNC: 24 MMOL/L (ref 21–32)
CREAT SERPL-MCNC: 1.2 MG/DL (ref 0.8–1.3)
DEPRECATED RDW RBC AUTO: 13.5 % (ref 12.4–15.4)
EKG ATRIAL RATE: 62 BPM
EKG DIAGNOSIS: NORMAL
EKG P AXIS: 66 DEGREES
EKG P-R INTERVAL: 170 MS
EKG Q-T INTERVAL: 450 MS
EKG QRS DURATION: 86 MS
EKG QTC CALCULATION (BAZETT): 456 MS
EKG R AXIS: -47 DEGREES
EKG T AXIS: 6 DEGREES
EKG VENTRICULAR RATE: 62 BPM
GFR SERPLBLD CREATININE-BSD FMLA CKD-EPI: 63 ML/MIN/{1.73_M2}
GLUCOSE SERPL-MCNC: 119 MG/DL (ref 70–99)
HCT VFR BLD AUTO: 42.1 % (ref 40.5–52.5)
HGB BLD-MCNC: 14.2 G/DL (ref 13.5–17.5)
MCH RBC QN AUTO: 31 PG (ref 26–34)
MCHC RBC AUTO-ENTMCNC: 33.8 G/DL (ref 31–36)
MCV RBC AUTO: 91.8 FL (ref 80–100)
PLATELET # BLD AUTO: 145 K/UL (ref 135–450)
PMV BLD AUTO: 9.1 FL (ref 5–10.5)
POTASSIUM SERPL-SCNC: 3.8 MMOL/L (ref 3.5–5.1)
RBC # BLD AUTO: 4.58 M/UL (ref 4.2–5.9)
SODIUM SERPL-SCNC: 139 MMOL/L (ref 136–145)
WBC # BLD AUTO: 7.4 K/UL (ref 4–11)

## 2024-08-14 PROCEDURE — 80048 BASIC METABOLIC PNL TOTAL CA: CPT

## 2024-08-14 PROCEDURE — 85027 COMPLETE CBC AUTOMATED: CPT

## 2024-08-14 PROCEDURE — 6370000000 HC RX 637 (ALT 250 FOR IP): Performed by: INTERNAL MEDICINE

## 2024-08-14 PROCEDURE — 2580000003 HC RX 258: Performed by: INTERNAL MEDICINE

## 2024-08-14 RX ORDER — METOPROLOL SUCCINATE 25 MG/1
25 TABLET, EXTENDED RELEASE ORAL DAILY
Qty: 30 TABLET | Refills: 3 | Status: SHIPPED | OUTPATIENT
Start: 2024-08-15

## 2024-08-14 RX ORDER — ATORVASTATIN CALCIUM 80 MG/1
80 TABLET, FILM COATED ORAL NIGHTLY
Qty: 30 TABLET | Refills: 3 | Status: SHIPPED | OUTPATIENT
Start: 2024-08-14

## 2024-08-14 RX ADMIN — METOPROLOL SUCCINATE 25 MG: 25 TABLET, EXTENDED RELEASE ORAL at 07:41

## 2024-08-14 RX ADMIN — TICAGRELOR 90 MG: 90 TABLET ORAL at 07:41

## 2024-08-14 RX ADMIN — SODIUM CHLORIDE, PRESERVATIVE FREE 10 ML: 5 INJECTION INTRAVENOUS at 07:41

## 2024-08-14 RX ADMIN — ASPIRIN 81 MG: 81 TABLET, COATED ORAL at 07:41

## 2024-08-14 ASSESSMENT — PAIN SCALES - GENERAL
PAINLEVEL_OUTOF10: 0

## 2024-08-14 NOTE — PROGRESS NOTES
CLINICAL PHARMACY NOTE: MEDS TO BEDS    Total # of Prescriptions Filled: 3   The following medications were delivered to the patient:  ATORVASTATIN CALCIUM 80MG TABS  METOPROLOL SUCCINATE ER 25MG TB24  BRILINTA 90MG TABS    Additional Documentation: Iris GUERRERO approved to deliver medications to patient room=signed  [facility]

## 2024-08-14 NOTE — PLAN OF CARE
Problem: ABCDS Injury Assessment  Goal: Absence of physical injury  8/14/2024 0755 by Iris Lieberman, RN  Outcome: Completed     Problem: Safety - Adult  Goal: Free from fall injury  8/14/2024 0755 by Iris Lieberman, RN  Outcome: Completed     Problem: Pain  Goal: Verbalizes/displays adequate comfort level or baseline comfort level  8/14/2024 0755 by Iris Lieberman, RN  Outcome: Completed

## 2024-08-14 NOTE — CARE COORDINATION
Case Management -  Discharge Note      Patient Name: Joe Zepeda                   YOB: 1950            Readmission Risk (Low < 19, Mod (19-27), High > 27): Readmission Risk Score: 6.5    Current PCP: [facility], Rohith Villagomez DO      (IMM) Important Message from Medicare:    Has pt received appropriate IMM before discharge if required: yes  Date: 8/14/2024    PT AM-PAC:   /24  OT AM-PAC:   /24    Pt is independent. No therapy evaluations were ordered during admission.     Pt has no discharge needs.    Financial    Payor: [employer] / Plan: [employer] / Product Type: *No Product type* /     Pharmacy:  Potential assistance Purchasing Medications: No  Meds-to-Beds request: Yes      [facility] - [place], [place] - [address] - P [phone] - F [phone]  [address] [address]  Phone: [phone] Fax: [phone]      Notes:    Additional Case Management Notes: Patient discharged 8/14/2024 to home.  All discharge needs met per case management.    Sarah Watson RN, BSN  [phone]

## 2024-08-14 NOTE — CARE COORDINATION
08/14/24 1129   IMM Letter   IMM Letter given to Patient/Family/Significant other/Guardian/POA/by: Sarah Watsno RN CM   IMM Letter date given: 08/14/24   IMM Letter time given: 1107  (copy made for hard chart)        Statement Selected

## 2024-08-14 NOTE — DISCHARGE SUMMARY
a visually estimated EF of 45 - 50%. EF by 2D Simpsons Biplane is 50%. Left ventricle size is normal. Normal wall thickness. Akinesis of the following segments: apical anterior, apical lateral, apical septal and apical inferior. Akinesis of the apex. Grade II diastolic dysfunction with increased LAP. Average E/e' ratio is 11.38.    Mitral Valve: Mildly thickened, at the anterior and posterior leaflets.    Tricuspid Valve: Mild regurgitation. The estimated RVSP is 35 mmHg.    Left Atrium: Left atrium is mildly dilated. Left atrial volume index is mildly increased (35-41 mL/m2).    Image quality is adequate. Contrast used: Definity.     Left Ventricle: Mildly reduced left ventricular systolic function with a visually estimated EF of 45 - 50%.   EF by 2D Simpsons Biplane is 50%.      Left ventricle size is normal. Normal wall thickness.      Akinesis of the following segments: apical anterior, apical lateral, apical septal and apical inferior. Akinesis of the apex.      Grade II diastolic dysfunction with increased LAP. Average E/e' ratio is 11.38.     [facility] 8/12/24  HEMODYNAMIC / ANGIOGRAPHIC DATA:     /69 Left ventricular end diastolic pressure was 13 mmHg. There was no gradient across the aortic valve upon pullback.  The left main coronary artery reveals mild luminal irregularities.    The left anterior descending artery is heavily calcified and diffusely diseased with up to 95% stenosis in its midportion. First Diagonal with 70% proximal stenosis. Second diagonal up to 70% ostial stenosis.    Ramus with 80% proximal stenosis.    The left circumflex gives rise to several obtuse marginals. The left circumflex system up to 30% ostial stenosis. First OM mild diffuse disease.     The right coronary artery is a right dominant vessel. Proximal RCA with up 30% stenosis.    Distal RCA with 95% stenosis. Large RV branch with up to 70% mid stenosis.    LV gram not performed due to renal fxn    CONCLUSIONS:  Severe Native

## 2024-08-14 NOTE — DISCHARGE INSTRUCTIONS
Post Radial Angiogram Instructions    Care of your puncture site:  Remove clear bandage 24 hours after the procedure.  May shower in 24 hours  Inspect the site daily and gently clean using soap and water.  Dry thoroughly and apply a Bandaid for 24 hours    Normal Observations:  Soreness or tenderness which may last one week.  Mild oozing from the incision site.  Possible bruising that could last a couple of weeks.    Activity:  You may resume driving 24 hours following the procedure.  You may resume normal activity in 3 days or after the wound heals.  Avoid lifting more than 10 pounds for 3 days with affected arm.  Limit use of your wrist for 1 day after you come home. For example, don't type, knit or twist your wrist.     Nutrition:  *** diet.  Drink at least 8 to 10 glasses of decaffeinated, non-alcoholic fluid for the next 24 hours to flush the procedure dye used for your angiogram out of your body.    Other Instructions  Hold Metformin or Metformin containing drugs for 48 hours after procedure.    Cardiac Rehab: [phone]  Cardiac rehab may be recommended by your physician  You may have been given a brief explanation of Cardiac Rehab and instructions how to call Cardiac Rehab if referred.      Call your doctor immediately if your condition worsens, for any other concerns, for a follow-up appointment or if you experience any of the following:  Significant bleeding that does not stop after 10 MINUTES of applying FIRM pressure on the puncture site.  Increased swelling of the wrist.  Unusual pain, numbness, or tingling of the wrist/arm.   Any signs of infection such as: redness, yellow drainage at the site, swelling or pain. EXPECT bruising.

## 2024-08-14 NOTE — PROGRESS NOTES
Pt discharged per order to own car in parking lot with all belongings. Discharge instructs reviewed with pt. V/U. All questions answered. IV's removed.

## 2024-08-14 NOTE — CARE COORDINATION
Case Management Assessment  Initial Evaluation    Date/Time of Evaluation: 8/14/2024 11:25 AM  Assessment Completed by: LES CARTER RN    If patient is discharged prior to next notation, then this note serves as note for discharge by case management.    Patient Name: Joe Zepeda                   YOB: 1950  Diagnosis: STEMI (ST elevation myocardial infarction) (HCC) [I21.3]  ST elevation myocardial infarction (STEMI), unspecified artery (HCC) [I21.3]  Chest pain, unspecified type [R07.9]                   Date / Time: 8/12/2024  8:56 PM    Patient Admission Status: Inpatient   Readmission Risk (Low < 19, Mod (19-27), High > 27): Readmission Risk Score: 6.5    [facility]  PCP verified by CM? Yes    Chart Reviewed: Yes      History Provided by: Patient  Patient Orientation: Alert and Oriented    Patient Cognition: Alert    Hospitalization in the last 30 days (Readmission):  No    If yes, Readmission Assessment in CM Navigator will be completed.    Advance Directives:      Code Status: Full Code   Patient's Primary Decision Maker is: Legal Next of Kin      Discharge Planning:    Patient lives with: Alone Type of Home: Other (Comment) (Condo)  Primary Care Giver: Self  Patient Support Systems include: Children, Family Members, Friends/Neighbors   Current Financial resources: Medicare, [place] ([facility])  Current community resources: None  Current services prior to admission: C-pap            Current DME:              Type of Home Care services:  None    ADLS  Prior functional level: Independent in ADLs/IADLs  Current functional level: Independent in ADLs/IADLs    PT AM-PAC:   /24  OT AM-PAC:   /24    Family can provide assistance at DC: Other (comment) (n/a pt independent)  Would you like Case Management to discuss the discharge plan with any other family members/significant others, and if so, who? No  Plans to Return to Present Housing: Yes  Other

## 2024-08-14 NOTE — PLAN OF CARE
Problem: ABCDS Injury Assessment  Goal: Absence of physical injury  8/14/2024 0753 by Iris Lieberman, RN  Outcome: Progressing     Problem: Safety - Adult  Goal: Free from fall injury  8/14/2024 0753 by Iris Lieberman, RN  Outcome: Progressing    Problem: Pain  Goal: Verbalizes/displays adequate comfort level or baseline comfort level  8/14/2024 0753 by Iris Lieberman, RN  Outcome: Progressing

## 2024-08-15 ENCOUNTER — TELEPHONE (OUTPATIENT)
Dept: CARDIOLOGY CLINIC | Age: 74
End: 2024-08-15

## 2024-08-15 LAB
ECHO BSA: 1.88 M2
POC ACT LR: 343 SEC

## 2024-08-15 NOTE — TELEPHONE ENCOUNTER
Pt called to get direction on the med ticagrelor 9mg how to take it. Per Pt on his bottle it states to take at night.  Please call discuss this matter.  Thank you

## 2024-08-15 NOTE — TELEPHONE ENCOUNTER
Please call the patient and make sure it is 90 mg and it should be taken twice a day. Thank mary anni.

## 2024-09-16 PROBLEM — I25.10 CAD (CORONARY ARTERY DISEASE): Status: ACTIVE | Noted: 2024-08-14

## 2024-09-23 ENCOUNTER — OFFICE VISIT (OUTPATIENT)
Dept: CARDIOLOGY CLINIC | Age: 74
End: 2024-09-23
Payer: OTHER GOVERNMENT

## 2024-09-23 VITALS
SYSTOLIC BLOOD PRESSURE: 122 MMHG | WEIGHT: 161.5 LBS | DIASTOLIC BLOOD PRESSURE: 70 MMHG | BODY MASS INDEX: 23.92 KG/M2 | HEIGHT: 69 IN | OXYGEN SATURATION: 96 % | HEART RATE: 60 BPM

## 2024-09-23 DIAGNOSIS — E78.2 MIXED HYPERLIPIDEMIA: ICD-10-CM

## 2024-09-23 DIAGNOSIS — I25.10 CORONARY ARTERY DISEASE INVOLVING NATIVE CORONARY ARTERY OF NATIVE HEART WITHOUT ANGINA PECTORIS: Primary | ICD-10-CM

## 2024-09-23 DIAGNOSIS — I10 ESSENTIAL HYPERTENSION: ICD-10-CM

## 2024-09-23 PROCEDURE — 3074F SYST BP LT 130 MM HG: CPT | Performed by: INTERNAL MEDICINE

## 2024-09-23 PROCEDURE — 99215 OFFICE O/P EST HI 40 MIN: CPT | Performed by: INTERNAL MEDICINE

## 2024-09-23 PROCEDURE — 3078F DIAST BP <80 MM HG: CPT | Performed by: INTERNAL MEDICINE

## 2024-09-23 PROCEDURE — 1123F ACP DISCUSS/DSCN MKR DOCD: CPT | Performed by: INTERNAL MEDICINE

## 2024-09-23 RX ORDER — LANOLIN ALCOHOL/MO/W.PET/CERES
1000 CREAM (GRAM) TOPICAL DAILY
COMMUNITY

## 2024-10-21 ENCOUNTER — HOSPITAL ENCOUNTER (OUTPATIENT)
Age: 74
Discharge: HOME | End: 2024-10-21
Payer: OTHER GOVERNMENT

## 2024-10-21 DIAGNOSIS — I25.10 CORONARY ARTERY DISEASE INVOLVING NATIVE CORONARY ARTERY OF NATIVE HEART WITHOUT ANGINA PECTORIS: ICD-10-CM

## 2024-10-21 LAB
ANION GAP SERPL CALCULATED.3IONS-SCNC: 11 MMOL/L (ref 3–16)
BUN SERPL-MCNC: 19 MG/DL (ref 7–20)
CALCIUM SERPL-MCNC: 9.2 MG/DL (ref 8.3–10.6)
CHLORIDE SERPL-SCNC: 104 MMOL/L (ref 99–110)
CO2 SERPL-SCNC: 24 MMOL/L (ref 21–32)
CREAT SERPL-MCNC: 1.5 MG/DL (ref 0.8–1.3)
DEPRECATED RDW RBC AUTO: 14.1 % (ref 12.4–15.4)
GFR SERPLBLD CREATININE-BSD FMLA CKD-EPI: 48 ML/MIN/{1.73_M2}
GLUCOSE SERPL-MCNC: 92 MG/DL (ref 70–99)
HCT VFR BLD AUTO: 38.9 % (ref 40.5–52.5)
HGB BLD-MCNC: 13.4 G/DL (ref 13.5–17.5)
MCH RBC QN AUTO: 32.1 PG (ref 26–34)
MCHC RBC AUTO-ENTMCNC: 34.3 G/DL (ref 31–36)
MCV RBC AUTO: 93.4 FL (ref 80–100)
PLATELET # BLD AUTO: 114 K/UL (ref 135–450)
PMV BLD AUTO: 10.5 FL (ref 5–10.5)
POTASSIUM SERPL-SCNC: 4.2 MMOL/L (ref 3.5–5.1)
RBC # BLD AUTO: 4.16 M/UL (ref 4.2–5.9)
SODIUM SERPL-SCNC: 139 MMOL/L (ref 136–145)
WBC # BLD AUTO: 3.7 K/UL (ref 4–11)

## 2024-10-21 PROCEDURE — 36415 COLL VENOUS BLD VENIPUNCTURE: CPT

## 2024-10-21 PROCEDURE — 85027 COMPLETE CBC AUTOMATED: CPT

## 2024-10-21 PROCEDURE — 80048 BASIC METABOLIC PNL TOTAL CA: CPT

## 2024-10-24 DIAGNOSIS — I10 ESSENTIAL HYPERTENSION: Primary | ICD-10-CM

## 2024-10-28 ENCOUNTER — HOSPITAL ENCOUNTER (INPATIENT)
Age: 74
LOS: 1 days | Discharge: HOME | DRG: 322 | End: 2024-10-29
Attending: INTERNAL MEDICINE | Admitting: INTERNAL MEDICINE
Payer: OTHER GOVERNMENT

## 2024-10-28 DIAGNOSIS — I25.10 CAD (CORONARY ARTERY DISEASE): ICD-10-CM

## 2024-10-28 DIAGNOSIS — I25.83 CORONARY ARTERY DISEASE DUE TO LIPID RICH PLAQUE: Primary | ICD-10-CM

## 2024-10-28 DIAGNOSIS — I25.10 CORONARY ARTERY DISEASE DUE TO LIPID RICH PLAQUE: Primary | ICD-10-CM

## 2024-10-28 PROBLEM — I20.0 UNSTABLE ANGINA (HCC): Status: ACTIVE | Noted: 2024-10-28

## 2024-10-28 LAB
ANION GAP SERPL CALCULATED.3IONS-SCNC: 12 MMOL/L (ref 3–16)
BUN SERPL-MCNC: 21 MG/DL (ref 7–20)
CALCIUM SERPL-MCNC: 9.4 MG/DL (ref 8.3–10.6)
CHLORIDE SERPL-SCNC: 102 MMOL/L (ref 99–110)
CO2 SERPL-SCNC: 23 MMOL/L (ref 21–32)
CREAT SERPL-MCNC: 1.5 MG/DL (ref 0.8–1.3)
DEPRECATED RDW RBC AUTO: 14 % (ref 12.4–15.4)
GFR SERPLBLD CREATININE-BSD FMLA CKD-EPI: 48 ML/MIN/{1.73_M2}
GLUCOSE SERPL-MCNC: 81 MG/DL (ref 70–99)
HCT VFR BLD AUTO: 41 % (ref 40.5–52.5)
HGB BLD-MCNC: 14 G/DL (ref 13.5–17.5)
MCH RBC QN AUTO: 31.6 PG (ref 26–34)
MCHC RBC AUTO-ENTMCNC: 34 G/DL (ref 31–36)
MCV RBC AUTO: 93 FL (ref 80–100)
PLATELET # BLD AUTO: 132 K/UL (ref 135–450)
PMV BLD AUTO: 9.2 FL (ref 5–10.5)
POTASSIUM SERPL-SCNC: 4 MMOL/L (ref 3.5–5.1)
RBC # BLD AUTO: 4.41 M/UL (ref 4.2–5.9)
SODIUM SERPL-SCNC: 137 MMOL/L (ref 136–145)
WBC # BLD AUTO: 4.8 K/UL (ref 4–11)

## 2024-10-28 PROCEDURE — B2111ZZ FLUOROSCOPY OF MULTIPLE CORONARY ARTERIES USING LOW OSMOLAR CONTRAST: ICD-10-PCS | Performed by: INTERNAL MEDICINE

## 2024-10-28 PROCEDURE — 85027 COMPLETE CBC AUTOMATED: CPT

## 2024-10-28 PROCEDURE — 6370000000 HC RX 637 (ALT 250 FOR IP): Performed by: INTERNAL MEDICINE

## 2024-10-28 PROCEDURE — 80048 BASIC METABOLIC PNL TOTAL CA: CPT

## 2024-10-28 PROCEDURE — C9600 PERC DRUG-EL COR STENT SING: HCPCS | Performed by: INTERNAL MEDICINE

## 2024-10-28 PROCEDURE — 99153 MOD SED SAME PHYS/QHP EA: CPT | Performed by: INTERNAL MEDICINE

## 2024-10-28 PROCEDURE — 2709999900 HC NON-CHARGEABLE SUPPLY: Performed by: INTERNAL MEDICINE

## 2024-10-28 PROCEDURE — 85347 COAGULATION TIME ACTIVATED: CPT

## 2024-10-28 PROCEDURE — 99152 MOD SED SAME PHYS/QHP 5/>YRS: CPT | Performed by: INTERNAL MEDICINE

## 2024-10-28 PROCEDURE — C1874 STENT, COATED/COV W/DEL SYS: HCPCS | Performed by: INTERNAL MEDICINE

## 2024-10-28 PROCEDURE — 7100000011 HC PHASE II RECOVERY - ADDTL 15 MIN: Performed by: INTERNAL MEDICINE

## 2024-10-28 PROCEDURE — B2151ZZ FLUOROSCOPY OF LEFT HEART USING LOW OSMOLAR CONTRAST: ICD-10-PCS | Performed by: INTERNAL MEDICINE

## 2024-10-28 PROCEDURE — 93005 ELECTROCARDIOGRAM TRACING: CPT | Performed by: INTERNAL MEDICINE

## 2024-10-28 PROCEDURE — 36415 COLL VENOUS BLD VENIPUNCTURE: CPT

## 2024-10-28 PROCEDURE — 6360000002 HC RX W HCPCS: Performed by: INTERNAL MEDICINE

## 2024-10-28 PROCEDURE — C1769 GUIDE WIRE: HCPCS | Performed by: INTERNAL MEDICINE

## 2024-10-28 PROCEDURE — C1894 INTRO/SHEATH, NON-LASER: HCPCS | Performed by: INTERNAL MEDICINE

## 2024-10-28 PROCEDURE — 2060000000 HC ICU INTERMEDIATE R&B

## 2024-10-28 PROCEDURE — C9601 PERC DRUG-EL COR STENT BRAN: HCPCS | Performed by: INTERNAL MEDICINE

## 2024-10-28 PROCEDURE — C1725 CATH, TRANSLUMIN NON-LASER: HCPCS | Performed by: INTERNAL MEDICINE

## 2024-10-28 PROCEDURE — 2580000003 HC RX 258: Performed by: INTERNAL MEDICINE

## 2024-10-28 PROCEDURE — 2500000003 HC RX 250 WO HCPCS: Performed by: INTERNAL MEDICINE

## 2024-10-28 PROCEDURE — C1887 CATHETER, GUIDING: HCPCS | Performed by: INTERNAL MEDICINE

## 2024-10-28 PROCEDURE — 7100000010 HC PHASE II RECOVERY - FIRST 15 MIN: Performed by: INTERNAL MEDICINE

## 2024-10-28 PROCEDURE — 6360000004 HC RX CONTRAST MEDICATION: Performed by: INTERNAL MEDICINE

## 2024-10-28 PROCEDURE — 027136Z DILATION OF CORONARY ARTERY, TWO ARTERIES WITH THREE DRUG-ELUTING INTRALUMINAL DEVICES, PERCUTANEOUS APPROACH: ICD-10-PCS | Performed by: INTERNAL MEDICINE

## 2024-10-28 DEVICE — EVEROLIMUS-ELUTING PLATINUM CHROMIUM CORONARY STENT SYSTEM
Type: IMPLANTABLE DEVICE | Status: FUNCTIONAL
Brand: SYNERGY™ XD

## 2024-10-28 RX ORDER — MIDAZOLAM HYDROCHLORIDE 1 MG/ML
INJECTION, SOLUTION INTRAMUSCULAR; INTRAVENOUS PRN
Status: DISCONTINUED | OUTPATIENT
Start: 2024-10-28 | End: 2024-10-28 | Stop reason: HOSPADM

## 2024-10-28 RX ORDER — HEPARIN SODIUM 1000 [USP'U]/ML
INJECTION, SOLUTION INTRAVENOUS; SUBCUTANEOUS PRN
Status: DISCONTINUED | OUTPATIENT
Start: 2024-10-28 | End: 2024-10-28 | Stop reason: HOSPADM

## 2024-10-28 RX ORDER — METOPROLOL SUCCINATE 25 MG/1
25 TABLET, EXTENDED RELEASE ORAL DAILY
Status: DISCONTINUED | OUTPATIENT
Start: 2024-10-29 | End: 2024-10-29 | Stop reason: HOSPADM

## 2024-10-28 RX ORDER — SODIUM CHLORIDE 0.9 % (FLUSH) 0.9 %
5-40 SYRINGE (ML) INJECTION EVERY 12 HOURS SCHEDULED
Status: DISCONTINUED | OUTPATIENT
Start: 2024-10-28 | End: 2024-10-29 | Stop reason: HOSPADM

## 2024-10-28 RX ORDER — IOPAMIDOL 755 MG/ML
INJECTION, SOLUTION INTRAVASCULAR PRN
Status: DISCONTINUED | OUTPATIENT
Start: 2024-10-28 | End: 2024-10-28 | Stop reason: HOSPADM

## 2024-10-28 RX ORDER — SODIUM CHLORIDE 9 MG/ML
INJECTION, SOLUTION INTRAVENOUS PRN
Status: DISCONTINUED | OUTPATIENT
Start: 2024-10-28 | End: 2024-10-29 | Stop reason: HOSPADM

## 2024-10-28 RX ORDER — LIDOCAINE HYDROCHLORIDE 10 MG/ML
INJECTION, SOLUTION INFILTRATION; PERINEURAL PRN
Status: DISCONTINUED | OUTPATIENT
Start: 2024-10-28 | End: 2024-10-28 | Stop reason: HOSPADM

## 2024-10-28 RX ORDER — SODIUM CHLORIDE 0.9 % (FLUSH) 0.9 %
5-40 SYRINGE (ML) INJECTION PRN
Status: DISCONTINUED | OUTPATIENT
Start: 2024-10-28 | End: 2024-10-29 | Stop reason: HOSPADM

## 2024-10-28 RX ORDER — NITROGLYCERIN 20 MG/100ML
INJECTION INTRAVENOUS CONTINUOUS PRN
Status: COMPLETED | OUTPATIENT
Start: 2024-10-28 | End: 2024-10-28

## 2024-10-28 RX ORDER — ASPIRIN 81 MG/1
81 TABLET ORAL DAILY
Status: DISCONTINUED | OUTPATIENT
Start: 2024-10-28 | End: 2024-10-29 | Stop reason: HOSPADM

## 2024-10-28 RX ORDER — ACETAMINOPHEN 325 MG/1
650 TABLET ORAL EVERY 4 HOURS PRN
Status: DISCONTINUED | OUTPATIENT
Start: 2024-10-28 | End: 2024-10-29 | Stop reason: HOSPADM

## 2024-10-28 RX ORDER — ATORVASTATIN CALCIUM 80 MG/1
80 TABLET, FILM COATED ORAL NIGHTLY
Status: DISCONTINUED | OUTPATIENT
Start: 2024-10-28 | End: 2024-10-29 | Stop reason: HOSPADM

## 2024-10-28 RX ORDER — ATORVASTATIN CALCIUM 80 MG/1
80 TABLET, FILM COATED ORAL NIGHTLY
Status: DISCONTINUED | OUTPATIENT
Start: 2024-10-29 | End: 2024-10-28

## 2024-10-28 RX ORDER — HYDROXYCHLOROQUINE SULFATE 200 MG/1
200 TABLET, FILM COATED ORAL 2 TIMES DAILY
Status: DISCONTINUED | OUTPATIENT
Start: 2024-10-28 | End: 2024-10-29 | Stop reason: HOSPADM

## 2024-10-28 RX ORDER — FENTANYL CITRATE 50 UG/ML
INJECTION, SOLUTION INTRAMUSCULAR; INTRAVENOUS PRN
Status: DISCONTINUED | OUTPATIENT
Start: 2024-10-28 | End: 2024-10-28 | Stop reason: HOSPADM

## 2024-10-28 RX ORDER — LISINOPRIL 5 MG/1
7.5 TABLET ORAL DAILY
Status: DISCONTINUED | OUTPATIENT
Start: 2024-10-28 | End: 2024-10-29 | Stop reason: HOSPADM

## 2024-10-28 RX ADMIN — HYDROXYCHLOROQUINE SULFATE 200 MG: 200 TABLET ORAL at 21:17

## 2024-10-28 RX ADMIN — TICAGRELOR 90 MG: 90 TABLET ORAL at 21:17

## 2024-10-28 RX ADMIN — ATORVASTATIN CALCIUM 80 MG: 80 TABLET, FILM COATED ORAL at 21:17

## 2024-10-28 RX ADMIN — SODIUM CHLORIDE, PRESERVATIVE FREE 10 ML: 5 INJECTION INTRAVENOUS at 21:17

## 2024-10-28 ASSESSMENT — PAIN SCALES - GENERAL
PAINLEVEL_OUTOF10: 0
PAINLEVEL_OUTOF10: 0

## 2024-10-28 NOTE — H&P
CC: hospital follow up, CAD        Subjective:      History of Present Illness:     Joe Zepeda is a 74 y.o. patient with a PMH significant for coronary artery disease, hypertension and hyperlipidemia. He presented to the emergency room on 8/12/2024 with complaints of chest pain. He was taken to the cath lab for coronary angiogram. The angiogram revealed severe LAD stenosis and severe distal right coronary stenosis. A long discussion was regarding coronary bypass surgery, he would prefer PCI .      Today he is here alone. He reports he has been doing okay since discharge. He continues to have some SOB/NEVES. He denies chest pain/pressure. He is ready to have his staged PCI.      Past Medical History:   has no past medical history on file.     Surgical History:   has a past surgical history that includes hernia repair; Cardiac procedure (N/A, 8/12/2024); Cardiac procedure (N/A, 8/13/2024); and Cardiac procedure (N/A, 8/13/2024).      Social History:   reports that he quit smoking about 54 years ago. His smoking use included cigarettes. He started smoking about 22 years ago. He has a 22.7 pack-year smoking history. He has been exposed to tobacco smoke. He has never used smokeless tobacco. He reports that he does not drink alcohol and does not use drugs.      Family History:  family history is not on file.     Home Medications:  Were reviewed and are listed in nursing record and/or below  Home Medications           Prior to Admission medications    Medication Sig Start Date End Date Taking? Authorizing Provider   metoprolol succinate (TOPROL XL) 25 MG extended release tablet Take 1 tablet by mouth daily 8/15/24     Dominique Tovar APRN - CNP   atorvastatin (LIPITOR) 80 MG tablet Take 1 tablet by mouth nightly 8/14/24     Dominique Tovar APRN - CNP   ticagrelor (BRILINTA) 90 MG TABS tablet Take 1 tablet by mouth 2 times daily 8/14/24     Dominique Tovar APRN - CNP   hydroxychloroquine (PLAQUENIL) 200 MG

## 2024-10-28 NOTE — DISCHARGE INSTRUCTIONS
Radial Angiogram      Care of your puncture site:  Remove clear bandage 24 hours after the procedure.  May shower in 24 hours  Inspect the site daily and gently clean using soap and water.  Dry thoroughly and apply a Band-Aid.    Normal Observations:  Soreness or tenderness which may last one week.  Mild oozing from the incision site.  Possible bruising that could last 2 weeks.    Activity:  You may resume driving 24 hours following the procedure.  You may resume normal activity in 3 days or after the wound heals.  Avoid lifting more than 10 pounds for 3 days with affected arm.    Nutrition:  Regular diet .  Drink at least 8 to 10 glasses of decaffeinated, non-alcoholic fluid for the next 24 hours to flush the x-ray dye used for your angiogram out of your body.    Call your Cardiologist immediately if your condition worsens, for any other concerns, for a follow-up appointment or if you experience any of the following:  Significant bleeding that does not stop after 10 minutes of applying firm pressure on the puncture site.  Increased swelling of the wrist.  Unusual pain, numbness, or tingling of the wrist/arm.   Any signs of infection such as: redness, yellow drainage at the site, swelling or pain.

## 2024-10-28 NOTE — PLAN OF CARE
Problem: Discharge Planning  Goal: Discharge to home or other facility with appropriate resources  Outcome: Progressing  Flowsheets (Taken 10/28/2024 1715)  Discharge to home or other facility with appropriate resources: Identify barriers to discharge with patient and caregiver     Problem: Pain  Goal: Verbalizes/displays adequate comfort level or baseline comfort level  Outcome: Progressing     Problem: Safety - Adult  Goal: Free from fall injury  Outcome: Progressing     Problem: ABCDS Injury Assessment  Goal: Absence of physical injury  Outcome: Progressing

## 2024-10-28 NOTE — PROGRESS NOTES
Pt admitted from cath lab. R radial site WNL. Dressing is clean, dry and intact. Pt oriented to room. Bed is in lowest position and call light is within reach. All questions answered at this time.

## 2024-10-29 VITALS
TEMPERATURE: 97.7 F | BODY MASS INDEX: 23.19 KG/M2 | HEIGHT: 69 IN | HEART RATE: 48 BPM | SYSTOLIC BLOOD PRESSURE: 138 MMHG | DIASTOLIC BLOOD PRESSURE: 71 MMHG | WEIGHT: 156.6 LBS | RESPIRATION RATE: 16 BRPM | OXYGEN SATURATION: 100 %

## 2024-10-29 LAB
ANION GAP SERPL CALCULATED.3IONS-SCNC: 8 MMOL/L (ref 3–16)
BUN SERPL-MCNC: 20 MG/DL (ref 7–20)
CALCIUM SERPL-MCNC: 9.2 MG/DL (ref 8.3–10.6)
CHLORIDE SERPL-SCNC: 105 MMOL/L (ref 99–110)
CO2 SERPL-SCNC: 26 MMOL/L (ref 21–32)
CREAT SERPL-MCNC: 1.5 MG/DL (ref 0.8–1.3)
DEPRECATED RDW RBC AUTO: 14 % (ref 12.4–15.4)
ECHO BSA: 1.89 M2
EKG ATRIAL RATE: 66 BPM
EKG DIAGNOSIS: NORMAL
EKG P AXIS: 70 DEGREES
EKG P-R INTERVAL: 174 MS
EKG Q-T INTERVAL: 432 MS
EKG QRS DURATION: 96 MS
EKG QTC CALCULATION (BAZETT): 452 MS
EKG R AXIS: -58 DEGREES
EKG T AXIS: 65 DEGREES
EKG VENTRICULAR RATE: 66 BPM
GFR SERPLBLD CREATININE-BSD FMLA CKD-EPI: 48 ML/MIN/{1.73_M2}
GLUCOSE SERPL-MCNC: 92 MG/DL (ref 70–99)
HCT VFR BLD AUTO: 38.9 % (ref 40.5–52.5)
HGB BLD-MCNC: 13.4 G/DL (ref 13.5–17.5)
MCH RBC QN AUTO: 31.7 PG (ref 26–34)
MCHC RBC AUTO-ENTMCNC: 34.4 G/DL (ref 31–36)
MCV RBC AUTO: 92.4 FL (ref 80–100)
PLATELET # BLD AUTO: 119 K/UL (ref 135–450)
PMV BLD AUTO: 9.5 FL (ref 5–10.5)
POTASSIUM SERPL-SCNC: 4.7 MMOL/L (ref 3.5–5.1)
RBC # BLD AUTO: 4.21 M/UL (ref 4.2–5.9)
SODIUM SERPL-SCNC: 139 MMOL/L (ref 136–145)
WBC # BLD AUTO: 4.1 K/UL (ref 4–11)

## 2024-10-29 PROCEDURE — 99239 HOSP IP/OBS DSCHRG MGMT >30: CPT | Performed by: NURSE PRACTITIONER

## 2024-10-29 PROCEDURE — 36415 COLL VENOUS BLD VENIPUNCTURE: CPT

## 2024-10-29 PROCEDURE — 6370000000 HC RX 637 (ALT 250 FOR IP): Performed by: INTERNAL MEDICINE

## 2024-10-29 PROCEDURE — 2580000003 HC RX 258: Performed by: INTERNAL MEDICINE

## 2024-10-29 PROCEDURE — 85027 COMPLETE CBC AUTOMATED: CPT

## 2024-10-29 PROCEDURE — 80048 BASIC METABOLIC PNL TOTAL CA: CPT

## 2024-10-29 RX ORDER — METOPROLOL SUCCINATE 25 MG/1
12.5 TABLET, EXTENDED RELEASE ORAL DAILY
Qty: 30 TABLET | Refills: 3 | Status: SHIPPED | OUTPATIENT
Start: 2024-10-29 | End: 2024-10-29

## 2024-10-29 RX ORDER — METOPROLOL SUCCINATE 25 MG/1
12.5 TABLET, EXTENDED RELEASE ORAL DAILY
Qty: 45 TABLET | Refills: 3 | Status: SHIPPED | OUTPATIENT
Start: 2024-10-29

## 2024-10-29 RX ADMIN — TICAGRELOR 90 MG: 90 TABLET ORAL at 08:26

## 2024-10-29 RX ADMIN — SODIUM CHLORIDE, PRESERVATIVE FREE 10 ML: 5 INJECTION INTRAVENOUS at 08:27

## 2024-10-29 RX ADMIN — METOPROLOL SUCCINATE 25 MG: 25 TABLET, EXTENDED RELEASE ORAL at 08:26

## 2024-10-29 RX ADMIN — LISINOPRIL 7.5 MG: 5 TABLET ORAL at 08:25

## 2024-10-29 RX ADMIN — HYDROXYCHLOROQUINE SULFATE 200 MG: 200 TABLET ORAL at 08:26

## 2024-10-29 RX ADMIN — ASPIRIN 81 MG: 81 TABLET, COATED ORAL at 08:26

## 2024-10-29 ASSESSMENT — PAIN SCALES - GENERAL
PAINLEVEL_OUTOF10: 0
PAINLEVEL_OUTOF10: 0

## 2024-10-29 NOTE — PLAN OF CARE
Problem: Discharge Planning  Goal: Discharge to home or other facility with appropriate resources  10/28/2024 2137 by Britney Magana RN  Outcome: Progressing  10/28/2024 1737 by Mickie Yao RN  Outcome: Progressing  Flowsheets (Taken 10/28/2024 1717)  Discharge to home or other facility with appropriate resources: Identify barriers to discharge with patient and caregiver     Problem: Pain  Goal: Verbalizes/displays adequate comfort level or baseline comfort level  10/28/2024 2137 by Britney Magana RN  Outcome: Progressing  10/28/2024 1737 by Mickie Yao RN  Outcome: Progressing     Problem: Safety - Adult  Goal: Free from fall injury  10/28/2024 2137 by Britney Magana RN  Outcome: Progressing  10/28/2024 1737 by Mickie Yao RN  Outcome: Progressing     Problem: ABCDS Injury Assessment  Goal: Absence of physical injury  10/28/2024 2137 by Britney Magana RN  Outcome: Progressing  10/28/2024 1737 by Mickie Yao RN  Outcome: Progressing     Problem: Cardiovascular - Adult  Goal: Maintains optimal cardiac output and hemodynamic stability  Outcome: Progressing  Goal: Absence of cardiac dysrhythmias or at baseline  Outcome: Progressing

## 2024-10-29 NOTE — PROGRESS NOTES
Data- discharge order received, pt verbalized agreement to discharge, disposition to previous residence, no needs for HHC/DME.     Action- discharge instructions prepared and given to Joe Zepeda, pt verbalized understanding. Medication information packet given r/t NEW and/or CHANGED prescriptions emphasizing name/purpose/side effects, pt verbalized understanding. Discharge instruction summary: Diet- regular, Activity- As tolerated , Primary Care Physician as follows: [facility], Rohith [place], [phone] f/u appointment within 7 days, prescription medications filled [facility]. Inpatient surgical procedure precautions reviewed: [facility]    1. WEIGHT: Admit Weight - Scale: 73 kg (161 lb) (10/28/24 1130)        Today  Weight - Scale: 71 kg (156 lb 9.6 oz) (10/29/24 0415)       2. O2 SAT.: SpO2: 100 % (10/29/24 1221)    Response- Pt belongings gathered, IV removed. Disposition is home (no HHC/DME needs), transported with self, taken to lobby via w/c w/ staff, no complications.

## 2024-10-29 NOTE — CARE COORDINATION
Discharge Planning Note:  Chart reviewed for discharge needs and it appears that patient has minimal needs for discharge at this time. , Patient status post  LHC with 3 stents placed .    Risk Score 11 %     Primary Care Physician is [facility]    Primary insurance is [employer], [facility] [place]      Please notify case management if any discharge needs are identified.      Case management will continue to follow progress and update discharge plan as needed.

## 2024-10-29 NOTE — CARE COORDINATION
10/29/24 1311   IMM Letter   IMM Letter given to Patient/Family/Significant other/Guardian/POA/by: 10/29; IMM leter given to patient by CM   IMM Letter date given: 10/29/24   IMM Letter time given: 1300  (Patient in agreement with not having the four hours notice.)

## 2024-10-29 NOTE — DISCHARGE SUMMARY
[facility]    DISCHARGE SUMMARY      Patient ID:  Joe Zepeda  [id number] 74 y.o. 1950    Admit date: 10/28/2024    Discharge date: 10/29/2024 10/29/24    Admitting Physician: Faisal Masterson MD     Discharge NP: Iris Cardenas, APRN - CNP    Admission Diagnoses: CAD (coronary artery disease) [I25.10]  Unstable angina (HCC) [I20.0]    Discharge Diagnoses:   Patient Active Problem List   Diagnosis    STEMI (ST elevation myocardial infarction) (HCC)    CAD (coronary artery disease)    Unstable angina (HCC)         Discharged Condition: good    Hospital Course: Joe Zepeda is a 74 y.o. male who was admitted coronary artery disease, hypertension and hyperlipidemia. He presented to the emergency room on 8/12/2024 with complaints of chest pain. He was taken to the cath lab for coronary angiogram. The angiogram revealed severe LAD stenosis and severe distal right coronary stenosis. A long discussion was regarding coronary bypass surgery, he would prefer PCI .  Pt presented for staged PCI.     Coronary artery disease   ~ [facility] 8/12/24 with severe MVD, pt decided against CABG   ~ Staged PCI 8/13/24 of LAD and Ramus with JUVENTINO. Residual RCA disease.   ~ Staged PCI 10/28/24 of RCA with JUVENTINO.   ~ cont aspirin, brilinta, bb, statin     Ischemic cardiomyopathy   ~ Echo 8/2024 EF 45-50%  ~ on lisinopril and toprol. Decrease toprol to 12.5 at dc d/t bradycardia.     HTN  ~ controlled     HLD   ~ on statin     At discharge, pt reports he is feeling well. No chest pain or sob. Tele SB, high 40s-low 50s. Asymptomatic. He is feeling ready for dc.   Right radial procedure site c/d/I. No hematoma or bruising. Good pulses, color, warmth, and sensation to that extremity.   Feeling ready for dc.     Medications and discharge instructions reviewed.  All questions and concerns addressed    Consults: None    The patient was seen and examined. Notes, labs, and recent testing reviewed.   There were not complications

## 2024-10-30 ENCOUNTER — PATIENT MESSAGE (OUTPATIENT)
Dept: CARDIOLOGY CLINIC | Age: 74
End: 2024-10-30

## 2024-10-30 LAB — POC ACT LR: >400 SEC

## 2024-10-31 NOTE — TELEPHONE ENCOUNTER
Medication Question/Concern    What is the name of the medication you need to speak with someone about?  metoprolol succinate (TOPROL XL)     Was this prescribed by your cardiologist?     Dosage of the medication:  25 MG extended release tablet     How are you taking this medication (QD, BID, TID, QID, PRN):  Take 0.5 tablets by mouth daily     What issues/concerns are you having with this medication?  Patient called in stating he still had questions about the new dosage and how he should be taking it.     Please advise.     Callback: [phone]

## 2024-11-06 RX ORDER — METOPROLOL SUCCINATE 25 MG/1
25 TABLET, EXTENDED RELEASE ORAL DAILY
Qty: 90 TABLET | Refills: 3
Start: 2024-11-06

## 2024-11-06 NOTE — TELEPHONE ENCOUNTER
Patient called and says that he has been taking metoprolol 25 mg daily. He is tolerating fine without symptoms. I let him know to continue the 25 mg. Medication list updated

## 2024-11-07 NOTE — TELEPHONE ENCOUNTER
Joe called in he would like a call concerning his metoprolol,he states there has been a change in his lisinopril, he was taking 7.5 mg and it was increased to 10 mg.      He can be reached at [phone].

## 2024-12-02 ENCOUNTER — OFFICE VISIT (OUTPATIENT)
Dept: CARDIOLOGY CLINIC | Age: 74
End: 2024-12-02

## 2024-12-02 ENCOUNTER — TELEPHONE (OUTPATIENT)
Dept: CARDIOLOGY CLINIC | Age: 74
End: 2024-12-02

## 2024-12-02 VITALS
HEIGHT: 69 IN | DIASTOLIC BLOOD PRESSURE: 74 MMHG | WEIGHT: 164 LBS | SYSTOLIC BLOOD PRESSURE: 128 MMHG | HEART RATE: 54 BPM | BODY MASS INDEX: 24.29 KG/M2 | OXYGEN SATURATION: 96 %

## 2024-12-02 DIAGNOSIS — I25.10 CORONARY ARTERY DISEASE INVOLVING NATIVE CORONARY ARTERY OF NATIVE HEART WITHOUT ANGINA PECTORIS: Primary | ICD-10-CM

## 2024-12-02 DIAGNOSIS — I10 ESSENTIAL HYPERTENSION: ICD-10-CM

## 2024-12-02 DIAGNOSIS — E78.2 MIXED HYPERLIPIDEMIA: ICD-10-CM

## 2024-12-02 NOTE — PROGRESS NOTES
[facility]  Cardiology Consult    Joe Zepeda  1950 December 2, 2024      Reason for Referral: Coronary artery disease    Referring physician:    CC: \"I am  doing good.\"      Subjective:     History of Present Illness:    Joe Zepeda is a 74 y.o. patient with a PMH significant for coronary artery disease, hypertension and hyperlipidemia. He presented to the emergency room on 8/12/2024 with complaints of chest pain. He was taken to the cath lab for coronary angiogram. The angiogram revealed severe LAD stenosis and severe distal right coronary stenosis. A long discussion was regarding coronary bypass surgery, he would prefer PCI .     Today, he is here for follow up. He has been doing well. He tries to exercise on a regular basis. He does skip a day here or there due to his back pain. He wants to try to increase his exercise regimen. Patient denies exertional chest pain/pressure, dyspnea at rest, NEVES, PND, orthopnea, palpitations, lightheadedness, weight changes, changes in LE edema, and syncope. Patient reports compliance to his medications.      Past Medical History:   has a past medical history of Chronic kidney disease, Hypertension, and Sleep apnea.    Surgical History:   has a past surgical history that includes hernia repair; Cardiac procedure (N/A, 8/12/2024); Cardiac procedure (N/A, 8/13/2024); Cardiac procedure (N/A, 8/13/2024); and Cardiac procedure (N/A, 10/28/2024).     Social History:   reports that he quit smoking about 54 years ago. His smoking use included cigarettes. He started smoking about 22 years ago. He has a 28 pack-year smoking history. He has been exposed to tobacco smoke. He has never used smokeless tobacco. He reports that he does not currently use alcohol. He reports that he does not currently use drugs after having used the following drugs: Marijuana (Weed).     Family History:  family history includes Heart Disease in his sister; Heart Failure in his

## 2024-12-04 ENCOUNTER — HOSPITAL ENCOUNTER (OUTPATIENT)
Age: 74
Discharge: HOME | End: 2024-12-04
Payer: OTHER GOVERNMENT

## 2024-12-04 DIAGNOSIS — I25.10 CORONARY ARTERY DISEASE INVOLVING NATIVE CORONARY ARTERY OF NATIVE HEART WITHOUT ANGINA PECTORIS: ICD-10-CM

## 2024-12-04 DIAGNOSIS — E78.2 MIXED HYPERLIPIDEMIA: ICD-10-CM

## 2024-12-04 LAB
ALBUMIN SERPL-MCNC: 4.2 G/DL (ref 3.4–5)
ALBUMIN/GLOB SERPL: 1.8 {RATIO} (ref 1.1–2.2)
ALP SERPL-CCNC: 101 U/L (ref 40–129)
ALT SERPL-CCNC: 20 U/L (ref 10–40)
ANION GAP SERPL CALCULATED.3IONS-SCNC: 11 MMOL/L (ref 3–16)
AST SERPL-CCNC: 30 U/L (ref 15–37)
BILIRUB SERPL-MCNC: 0.4 MG/DL (ref 0–1)
BUN SERPL-MCNC: 22 MG/DL (ref 7–20)
CALCIUM SERPL-MCNC: 8.8 MG/DL (ref 8.3–10.6)
CHLORIDE SERPL-SCNC: 104 MMOL/L (ref 99–110)
CHOLEST SERPL-MCNC: 73 MG/DL (ref 0–199)
CO2 SERPL-SCNC: 24 MMOL/L (ref 21–32)
CREAT SERPL-MCNC: 1.5 MG/DL (ref 0.8–1.3)
GFR SERPLBLD CREATININE-BSD FMLA CKD-EPI: 48 ML/MIN/{1.73_M2}
GLUCOSE SERPL-MCNC: 114 MG/DL (ref 70–99)
HDLC SERPL-MCNC: 49 MG/DL (ref 40–60)
LDL CHOLESTEROL: 16 MG/DL
POTASSIUM SERPL-SCNC: 4.4 MMOL/L (ref 3.5–5.1)
PROT SERPL-MCNC: 6.6 G/DL (ref 6.4–8.2)
SODIUM SERPL-SCNC: 139 MMOL/L (ref 136–145)
TRIGL SERPL-MCNC: 40 MG/DL (ref 0–150)
VLDLC SERPL CALC-MCNC: 8 MG/DL

## 2024-12-04 PROCEDURE — 80053 COMPREHEN METABOLIC PANEL: CPT

## 2024-12-04 PROCEDURE — 80061 LIPID PANEL: CPT

## 2024-12-04 PROCEDURE — 36415 COLL VENOUS BLD VENIPUNCTURE: CPT

## 2025-05-20 ENCOUNTER — TRANSCRIBE ORDERS (OUTPATIENT)
Dept: ADMINISTRATIVE | Age: 75
End: 2025-05-20

## 2025-05-20 DIAGNOSIS — I42.9 CARDIOMYOPATHY, UNSPECIFIED TYPE (HCC): Primary | ICD-10-CM

## 2025-06-24 ENCOUNTER — HOSPITAL ENCOUNTER (OUTPATIENT)
Age: 75
Discharge: HOME | End: 2025-06-26
Payer: OTHER GOVERNMENT

## 2025-06-24 VITALS
SYSTOLIC BLOOD PRESSURE: 164 MMHG | WEIGHT: 164 LBS | DIASTOLIC BLOOD PRESSURE: 70 MMHG | BODY MASS INDEX: 24.29 KG/M2 | HEIGHT: 69 IN

## 2025-06-24 DIAGNOSIS — I42.9 CARDIOMYOPATHY, UNSPECIFIED TYPE (HCC): ICD-10-CM

## 2025-06-24 LAB
ECHO AO ROOT DIAM: 3.5 CM
ECHO AO ROOT INDEX: 1.84 CM/M2
ECHO AV AREA PEAK VELOCITY: 2.3 CM2
ECHO AV AREA VTI: 2.4 CM2
ECHO AV AREA/BSA PEAK VELOCITY: 1.2 CM2/M2
ECHO AV AREA/BSA VTI: 1.3 CM2/M2
ECHO AV MEAN GRADIENT: 4 MMHG
ECHO AV MEAN VELOCITY: 1 M/S
ECHO AV PEAK GRADIENT: 7 MMHG
ECHO AV PEAK VELOCITY: 1.4 M/S
ECHO AV VELOCITY RATIO: 0.71
ECHO AV VTI: 31.9 CM
ECHO BSA: 1.9 M2
ECHO EST RA PRESSURE: 3 MMHG
ECHO IVC PROX: 1.8 CM
ECHO LA AREA 2C: 27.6 CM2
ECHO LA AREA 4C: 20.5 CM2
ECHO LA DIAMETER INDEX: 1.42 CM/M2
ECHO LA DIAMETER: 2.7 CM
ECHO LA MAJOR AXIS: 5.8 CM
ECHO LA MINOR AXIS: 6 CM
ECHO LA TO AORTIC ROOT RATIO: 0.77
ECHO LA VOL BP: 71 ML (ref 18–58)
ECHO LA VOL MOD A2C: 94 ML (ref 18–58)
ECHO LA VOL MOD A4C: 54 ML (ref 18–58)
ECHO LA VOL/BSA BIPLANE: 37 ML/M2 (ref 16–34)
ECHO LA VOLUME INDEX MOD A2C: 49 ML/M2 (ref 16–34)
ECHO LA VOLUME INDEX MOD A4C: 28 ML/M2 (ref 16–34)
ECHO LV E' LATERAL VELOCITY: 10.7 CM/S
ECHO LV E' SEPTAL VELOCITY: 8.38 CM/S
ECHO LV EDV A2C: 79 ML
ECHO LV EDV A4C: 73 ML
ECHO LV EDV INDEX A4C: 38 ML/M2
ECHO LV EDV NDEX A2C: 42 ML/M2
ECHO LV EF PHYSICIAN: 60 %
ECHO LV EJECTION FRACTION A2C: 69 %
ECHO LV EJECTION FRACTION A4C: 62 %
ECHO LV EJECTION FRACTION BIPLANE: 66 % (ref 55–100)
ECHO LV ESV A2C: 24 ML
ECHO LV ESV A4C: 28 ML
ECHO LV ESV INDEX A2C: 13 ML/M2
ECHO LV ESV INDEX A4C: 15 ML/M2
ECHO LV FRACTIONAL SHORTENING: 31 % (ref 28–44)
ECHO LV INTERNAL DIMENSION DIASTOLE INDEX: 2.37 CM/M2
ECHO LV INTERNAL DIMENSION DIASTOLIC: 4.5 CM (ref 4.2–5.9)
ECHO LV INTERNAL DIMENSION SYSTOLIC INDEX: 1.63 CM/M2
ECHO LV INTERNAL DIMENSION SYSTOLIC: 3.1 CM
ECHO LV IVSD: 0.9 CM (ref 0.6–1)
ECHO LV MASS 2D: 123.1 G (ref 88–224)
ECHO LV MASS INDEX 2D: 64.8 G/M2 (ref 49–115)
ECHO LV POSTERIOR WALL DIASTOLIC: 0.8 CM (ref 0.6–1)
ECHO LV RELATIVE WALL THICKNESS RATIO: 0.36
ECHO LVOT AREA: 3.1 CM2
ECHO LVOT AV VTI INDEX: 0.77
ECHO LVOT DIAM: 2 CM
ECHO LVOT MEAN GRADIENT: 2 MMHG
ECHO LVOT PEAK GRADIENT: 4 MMHG
ECHO LVOT PEAK VELOCITY: 1 M/S
ECHO LVOT STROKE VOLUME INDEX: 40.5 ML/M2
ECHO LVOT SV: 76.9 ML
ECHO LVOT VTI: 24.5 CM
ECHO MV A VELOCITY: 0.72 M/S
ECHO MV E VELOCITY: 0.99 M/S
ECHO MV E/A RATIO: 1.38
ECHO MV E/E' LATERAL: 9.25
ECHO MV E/E' RATIO (AVERAGED): 10.53
ECHO MV E/E' SEPTAL: 11.81
ECHO PV MAX VELOCITY: 1 M/S
ECHO PV PEAK GRADIENT: 4 MMHG
ECHO RA AREA 4C: 18 CM2
ECHO RA END SYSTOLIC VOLUME APICAL 4 CHAMBER INDEX BSA: 26 ML/M2
ECHO RA VOLUME: 49 ML
ECHO RIGHT VENTRICULAR SYSTOLIC PRESSURE (RVSP): 43 MMHG
ECHO RV BASAL DIMENSION: 3.8 CM
ECHO RV FREE WALL PEAK S': 16.9 CM/S
ECHO RV LONGITUDINAL DIMENSION: 7.5 CM
ECHO RV MID DIMENSION: 2 CM
ECHO RV TAPSE: 2.7 CM (ref 1.7–?)
ECHO TV REGURGITANT MAX VELOCITY: 3.15 M/S
ECHO TV REGURGITANT PEAK GRADIENT: 40 MMHG

## 2025-06-24 PROCEDURE — 93306 TTE W/DOPPLER COMPLETE: CPT | Performed by: INTERNAL MEDICINE

## 2025-06-24 PROCEDURE — 93306 TTE W/DOPPLER COMPLETE: CPT

## (undated) DEVICE — TR BAND RADIAL ARTERY COMPRESSION DEVICE: Brand: TR BAND

## (undated) DEVICE — CATH LAB PACK: Brand: MEDLINE INDUSTRIES, INC.

## (undated) DEVICE — CATHETER 5FR JL3.5 CORDIS 100CM

## (undated) DEVICE — 260 CM J TIP WIRE .035

## (undated) DEVICE — KIT AT-X65 ANGIOTOUCH HAND CONTROLLER

## (undated) DEVICE — GLIDESHEATH SLENDER STAINLESS STEEL KIT: Brand: GLIDESHEATH SLENDER

## (undated) DEVICE — Device: Brand: NOMOLINE™ LH ADULT NASAL CO2 CANNULA WITH O2 4M

## (undated) DEVICE — CATH BLLN ANGIO 2X15MM SC EUPHORA RX

## (undated) DEVICE — RUNTHROUGH NS EXTRA FLOPPY PTCA GUIDEWIRE: Brand: RUNTHROUGH

## (undated) DEVICE — PAD, DEFIB, ADULT, RADIOTRANS, PHYSIO: Brand: MEDLINE

## (undated) DEVICE — CATHETER DIAG L100CM DIA5.2FR 0.038IN POLYUR COR JR4 STD

## (undated) DEVICE — DRAPE,ANGIO,BRACH,STERILE,38X44: Brand: MEDLINE

## (undated) DEVICE — CATHETER GUID 6FR L100CM GRN PTFE JR4 TRUELUMEN HYBRID

## (undated) DEVICE — GUIDEWIRE VASC L260CM DIA0.035IN RAD 3MM J TIP L7CM PTFE